# Patient Record
Sex: MALE | Employment: OTHER
[De-identification: names, ages, dates, MRNs, and addresses within clinical notes are randomized per-mention and may not be internally consistent; named-entity substitution may affect disease eponyms.]

---

## 2024-05-06 ENCOUNTER — OFFICE VISIT (OUTPATIENT)
Dept: INTERNAL MEDICINE | Facility: OTHER | Age: 64
End: 2024-05-06
Payer: MEDICARE

## 2024-05-06 VITALS
HEIGHT: 68 IN | HEART RATE: 93 BPM | BODY MASS INDEX: 23.31 KG/M2 | WEIGHT: 153.8 LBS | SYSTOLIC BLOOD PRESSURE: 107 MMHG | TEMPERATURE: 97.7 F | DIASTOLIC BLOOD PRESSURE: 68 MMHG | OXYGEN SATURATION: 96 %

## 2024-05-06 DIAGNOSIS — E11.9 CONTROLLED TYPE 2 DIABETES MELLITUS WITHOUT COMPLICATION, WITHOUT LONG-TERM CURRENT USE OF INSULIN (HCC): ICD-10-CM

## 2024-05-06 DIAGNOSIS — Z13.228 SCREENING FOR METABOLIC DISORDER: ICD-10-CM

## 2024-05-06 DIAGNOSIS — R35.1 NOCTURIA: ICD-10-CM

## 2024-05-06 DIAGNOSIS — R91.1 LUNG NODULE: ICD-10-CM

## 2024-05-06 DIAGNOSIS — Z11.4 SCREENING FOR HIV WITHOUT PRESENCE OF RISK FACTORS: ICD-10-CM

## 2024-05-06 DIAGNOSIS — Z23 NEED FOR VACCINATION: ICD-10-CM

## 2024-05-06 DIAGNOSIS — E78.5 HYPERLIPIDEMIA, UNSPECIFIED HYPERLIPIDEMIA TYPE: ICD-10-CM

## 2024-05-06 PROCEDURE — G0009 ADMIN PNEUMOCOCCAL VACCINE: HCPCS | Mod: GC

## 2024-05-06 PROCEDURE — 99204 OFFICE O/P NEW MOD 45 MIN: CPT | Mod: 25,GC

## 2024-05-06 PROCEDURE — 90677 PCV20 VACCINE IM: CPT | Mod: GC

## 2024-05-06 RX ORDER — TAMSULOSIN HYDROCHLORIDE 0.4 MG/1
CAPSULE ORAL
COMMUNITY
Start: 2024-04-12 | End: 2024-05-06 | Stop reason: SDUPTHER

## 2024-05-06 RX ORDER — TAMSULOSIN HYDROCHLORIDE 0.4 MG/1
0.4 CAPSULE ORAL DAILY
Qty: 90 CAPSULE | Refills: 1 | Status: SHIPPED | OUTPATIENT
Start: 2024-05-06

## 2024-05-06 RX ORDER — EMPAGLIFLOZIN, METFORMIN HYDROCHLORIDE 12.5; 1 MG/1; MG/1
TABLET, EXTENDED RELEASE ORAL
COMMUNITY
Start: 2024-04-12 | End: 2024-05-06 | Stop reason: SDUPTHER

## 2024-05-06 RX ORDER — EMPAGLIFLOZIN, METFORMIN HYDROCHLORIDE 12.5; 1 MG/1; MG/1
TABLET, EXTENDED RELEASE ORAL
Qty: 180 TABLET | Refills: 1 | Status: SHIPPED | OUTPATIENT
Start: 2024-05-06

## 2024-05-06 RX ORDER — ATORVASTATIN CALCIUM 20 MG/1
TABLET, FILM COATED ORAL
Qty: 90 TABLET | Refills: 1 | Status: SHIPPED | OUTPATIENT
Start: 2024-05-06

## 2024-05-06 RX ORDER — SILDENAFIL 25 MG/1
TABLET, FILM COATED ORAL
COMMUNITY
End: 2024-05-06

## 2024-05-06 RX ORDER — ATORVASTATIN CALCIUM 20 MG/1
TABLET, FILM COATED ORAL
COMMUNITY
End: 2024-05-06 | Stop reason: SDUPTHER

## 2024-05-06 RX ORDER — EMPAGLIFLOZIN, METFORMIN HYDROCHLORIDE 25; 1000 MG/1; MG/1
TABLET, EXTENDED RELEASE ORAL
COMMUNITY
End: 2024-05-06 | Stop reason: CLARIF

## 2024-05-06 NOTE — PATIENT INSTRUCTIONS
Consider getting SHINGLES VACCINE at any pharmacy     Please obtain bloodwork prior to your next appointment.

## 2024-05-07 ENCOUNTER — TELEPHONE (OUTPATIENT)
Dept: INTERNAL MEDICINE | Facility: OTHER | Age: 64
End: 2024-05-07
Payer: MEDICARE

## 2024-05-07 NOTE — TELEPHONE ENCOUNTER
Phone Number Called: [phone]    Call outcome:  Spoke to [facility]     Message: Received fax for pt's atorvastatin to change to 100 day supply to be dispensed per insurance. Dr. Corey said it was okay to call pharmacy and relayed info. They are aware and will proceed with dispensing med. Closing enc

## 2024-05-08 PROBLEM — E11.8 TYPE 2 DIABETES MELLITUS WITH UNSPECIFIED COMPLICATIONS (HCC): Status: ACTIVE | Noted: 2024-05-08

## 2024-05-08 PROBLEM — E78.2 MIXED HYPERLIPIDEMIA: Status: ACTIVE | Noted: 2024-05-08

## 2024-05-08 PROBLEM — K63.5 POLYP OF COLON: Status: ACTIVE | Noted: 2022-10-17

## 2024-05-08 PROBLEM — N52.8 OTHER MALE ERECTILE DYSFUNCTION: Status: ACTIVE | Noted: 2024-05-08

## 2024-05-08 PROBLEM — R91.8 LUNG NODULES: Status: ACTIVE | Noted: 2024-05-08

## 2024-05-08 PROBLEM — N40.1 BENIGN PROSTATIC HYPERPLASIA WITH URINARY OBSTRUCTION: Status: ACTIVE | Noted: 2024-05-08

## 2024-05-08 PROBLEM — N13.8 BENIGN PROSTATIC HYPERPLASIA WITH URINARY OBSTRUCTION: Status: ACTIVE | Noted: 2024-05-08

## 2024-05-08 ASSESSMENT — ENCOUNTER SYMPTOMS
NEUROLOGICAL NEGATIVE: 1
VOMITING: 0
FEVER: 0
MUSCULOSKELETAL NEGATIVE: 1
ABDOMINAL PAIN: 0
NAUSEA: 0
CARDIOVASCULAR NEGATIVE: 1
CHILLS: 0
FLANK PAIN: 0
EYES NEGATIVE: 1
RESPIRATORY NEGATIVE: 1

## 2024-05-20 ENCOUNTER — HOSPITAL ENCOUNTER (OUTPATIENT)
Dept: LAB | Facility: MEDICAL CENTER | Age: 64
End: 2024-05-20
Payer: MEDICARE

## 2024-05-20 DIAGNOSIS — E11.9 CONTROLLED TYPE 2 DIABETES MELLITUS WITHOUT COMPLICATION, WITHOUT LONG-TERM CURRENT USE OF INSULIN (HCC): ICD-10-CM

## 2024-05-20 DIAGNOSIS — E78.5 HYPERLIPIDEMIA, UNSPECIFIED HYPERLIPIDEMIA TYPE: ICD-10-CM

## 2024-05-20 DIAGNOSIS — Z13.228 SCREENING FOR METABOLIC DISORDER: ICD-10-CM

## 2024-05-20 DIAGNOSIS — Z11.4 SCREENING FOR HIV WITHOUT PRESENCE OF RISK FACTORS: ICD-10-CM

## 2024-05-20 LAB — HIV 1+2 AB+HIV1 P24 AG SERPL QL IA: NORMAL

## 2024-05-21 LAB
ALBUMIN SERPL BCP-MCNC: 4.5 G/DL (ref 3.2–4.9)
ALBUMIN/GLOB SERPL: 1.5 G/DL
ALP SERPL-CCNC: 66 U/L (ref 30–99)
ALT SERPL-CCNC: 16 U/L (ref 2–50)
ANION GAP SERPL CALC-SCNC: 11 MMOL/L (ref 7–16)
AST SERPL-CCNC: 19 U/L (ref 12–45)
BILIRUB SERPL-MCNC: 0.5 MG/DL (ref 0.1–1.5)
BUN SERPL-MCNC: 21 MG/DL (ref 8–22)
CALCIUM ALBUM COR SERPL-MCNC: 8.7 MG/DL (ref 8.5–10.5)
CALCIUM SERPL-MCNC: 9.1 MG/DL (ref 8.5–10.5)
CHLORIDE SERPL-SCNC: 107 MMOL/L (ref 96–112)
CHOLEST SERPL-MCNC: 172 MG/DL (ref 100–199)
CO2 SERPL-SCNC: 23 MMOL/L (ref 20–33)
CREAT SERPL-MCNC: 0.95 MG/DL (ref 0.5–1.4)
CREAT UR-MCNC: 115.2 MG/DL
GFR SERPLBLD CREATININE-BSD FMLA CKD-EPI: 89 ML/MIN/1.73 M 2
GLOBULIN SER CALC-MCNC: 3.1 G/DL (ref 1.9–3.5)
GLUCOSE SERPL-MCNC: 138 MG/DL (ref 65–99)
HDLC SERPL-MCNC: 58 MG/DL
LDLC SERPL CALC-MCNC: 101 MG/DL
MICROALBUMIN UR-MCNC: <1.2 MG/DL
MICROALBUMIN/CREAT UR: NORMAL MG/G (ref 0–30)
POTASSIUM SERPL-SCNC: 4.6 MMOL/L (ref 3.6–5.5)
PROT SERPL-MCNC: 7.6 G/DL (ref 6–8.2)
SODIUM SERPL-SCNC: 141 MMOL/L (ref 135–145)
TRIGL SERPL-MCNC: 63 MG/DL (ref 0–149)

## 2024-06-10 ENCOUNTER — TELEPHONE (OUTPATIENT)
Dept: INTERNAL MEDICINE | Facility: OTHER | Age: 64
End: 2024-06-10

## 2024-06-10 ENCOUNTER — OFFICE VISIT (OUTPATIENT)
Dept: INTERNAL MEDICINE | Facility: OTHER | Age: 64
End: 2024-06-10
Payer: MEDICARE

## 2024-06-10 VITALS
DIASTOLIC BLOOD PRESSURE: 77 MMHG | WEIGHT: 154.6 LBS | BODY MASS INDEX: 24.85 KG/M2 | OXYGEN SATURATION: 96 % | HEART RATE: 93 BPM | SYSTOLIC BLOOD PRESSURE: 123 MMHG | TEMPERATURE: 97.2 F | HEIGHT: 66 IN

## 2024-06-10 DIAGNOSIS — Z23 NEED FOR TDAP VACCINATION: ICD-10-CM

## 2024-06-10 DIAGNOSIS — E78.5 HYPERLIPIDEMIA, UNSPECIFIED HYPERLIPIDEMIA TYPE: ICD-10-CM

## 2024-06-10 DIAGNOSIS — Z13.6 SCREENING FOR AAA (ABDOMINAL AORTIC ANEURYSM): ICD-10-CM

## 2024-06-10 DIAGNOSIS — E11.9 CONTROLLED TYPE 2 DIABETES MELLITUS WITHOUT COMPLICATION, WITHOUT LONG-TERM CURRENT USE OF INSULIN (HCC): ICD-10-CM

## 2024-06-10 DIAGNOSIS — G47.33 OSA (OBSTRUCTIVE SLEEP APNEA): ICD-10-CM

## 2024-06-10 PROBLEM — K62.1 RECTAL POLYP: Status: ACTIVE | Noted: 2022-10-17

## 2024-06-10 LAB
HBA1C MFR BLD: 6.7 % (ref ?–5.8)
POCT INT CON NEG: NEGATIVE
POCT INT CON POS: POSITIVE

## 2024-06-10 PROCEDURE — 99214 OFFICE O/P EST MOD 30 MIN: CPT | Mod: 25,GC

## 2024-06-10 PROCEDURE — 92250 FUNDUS PHOTOGRAPHY W/I&R: CPT | Mod: 26,GC

## 2024-06-10 PROCEDURE — 90471 IMMUNIZATION ADMIN: CPT | Mod: GC

## 2024-06-10 PROCEDURE — 83036 HEMOGLOBIN GLYCOSYLATED A1C: CPT | Mod: QW,GC

## 2024-06-10 PROCEDURE — 90715 TDAP VACCINE 7 YRS/> IM: CPT | Mod: GC

## 2024-06-10 RX ORDER — ATORVASTATIN CALCIUM 40 MG/1
TABLET, FILM COATED ORAL
Qty: 1 TABLET | Refills: 1 | Status: SHIPPED
Start: 2024-06-10 | End: 2024-06-10

## 2024-06-10 RX ORDER — ATORVASTATIN CALCIUM 40 MG/1
40 TABLET, FILM COATED ORAL DAILY
Qty: 30 TABLET | Refills: 1 | Status: SHIPPED | OUTPATIENT
Start: 2024-06-10

## 2024-06-10 NOTE — TELEPHONE ENCOUNTER
Called back number [phone]  I spoke with Shanae and let her know it was a 30 day supply with one refill. Patient should be able to  today.

## 2024-06-10 NOTE — PROGRESS NOTES
Chief Complaint   Patient presents with    Establish Care     Pt needs to establish care with Dr. Hewitt       HPI: Valeria Mattson is a 64 y.o. male who presented to the clinic for the following.    # Follow-up for lab test  # Type 2 diabetes  # Hyperlipidemia  Patient has a long history of diabetes and hyperlipidemia, currently he is on atorvastatin 20 mg daily, and synjardy 12.5-1000mg daily.    -Patient has done monofilament test last time.    -Annual retina screening test performed today  Lab test since last visit:  -Fasting glucose 138  -Total cholesterol 172, , HDL 58, triglycerides 63  -Microalbumin creatinine ratio normal  -10-Years ASCVD risk 18.2  - Repeat HbA1c today  6.7  Reports recently feels increased weight, not on diet control, otherwise has no symptoms such as chest pain, shortness of breath, cough, heart racing.  Discussed with patient that he needs more extensive control of the diet to decrease the blood sugar level, patient agrees to see the nutrition specialist; also due to increased LDL we will increase the atorvastatin dose to 40 mg, target LDL less than 70.     # Reestablish  PMH: Was diagnosed with sleep apnea previously when he was in [place], no follow-up or CPAP.  No hypertension history.  Has BPH, is on tamsulosin, symptom well-controlled.   MH: No other medications except for items mentioned above  SH: Quit smoking 6 years ago, was on 1-2 packs/day for 40 years, drinking alcohol occasionally, never use recreational drugs    Patient Active Problem List    Diagnosis Date Noted    AMAYA (obstructive sleep apnea) 06/10/2024    Benign prostatic hyperplasia with urinary obstruction 05/08/2024    Other male erectile dysfunction 05/08/2024    Lung nodules 05/08/2024    Type 2 diabetes mellitus without complication, without long-term current use of insulin (HCC) 05/08/2024    Mixed hyperlipidemia 05/08/2024    Polyp of colon 10/17/2022    Rectal polyp 10/17/2022       Current Outpatient  "Medications   Medication Sig Dispense Refill    atorvastatin (LIPITOR) 40 MG Tab Take 1 Tablet by mouth every day. 30 Tablet 1    tamsulosin (FLOMAX) 0.4 MG capsule Take 1 Capsule by mouth every day. 90 Capsule 1    SYNJARDY XR 12.5-1000 MG TABLET SR 24 HR TAKE ONE TABLET BY MOUTH TWICE A DAY WITH BREAKFAST AND DINNER FOR DIABETES 30 180 Tablet 1     No current facility-administered medications for this visit.       ROS: As per HPI. Additional pertinent systems as noted below.  Constitutional:  Negative for fever, chills   HENT:  Negative for ear pain, sore throat, runny nose   Eyes:  Negative for blurred vision and double vision   Cardiovascular:  Negative for chest pain, palpitations   Respiratory:  Negative for cough, sputum production, shortness of breath   Gastrointestinal: Negative for abdominal pain, nausea, vomiting, diarrhea, or blood in stools   Genitourinary: Negative for dysuria, flank pain and hematuria  Skin: Negative for rash, itching  Extremities: Negative for leg swelling   Neurologic: Negative for headaches, dizziness, seizures, weakness   Endo/Heme/Allergies: Negative for polydipsia. Does not bruise/bleed easily  Psychiatric: Negative for suicidal or homicidal ideas     /77 (BP Location: Left arm, Patient Position: Sitting, BP Cuff Size: Small adult)   Pulse 93   Temp 36.2 °C (97.2 °F) (Temporal)   Ht 1.664 m (5' 5.5\")   Wt 70.1 kg (154 lb 9.6 oz)   SpO2 96%   BMI 25.34 kg/m²     Physical Exam   Constitutional:  Well developed, well nourished. Not in acute distress   HENT:  Normocephalic, Atraumatic, Oropharynx is pink and moist. No oral exudates, Nose normal   Eyes:  EOMI, Conjunctiva normal, No discharge. PERRLA   Neck:  Normal range of motion, No cervical lymphadenopathy. No thyromegaly.   Cardiovascular:  Regular rate and rhythm, No pedal edema, Intact distal pulses   Respiratory: Clear to auscultation bilaterally, No use of accessory muscles   Gastrointestinal: Bowel sounds " normal, Soft, No tenderness, No palpable masses/hepatosplenomegaly   Skin: Warm, Dry, No erythema, No rash, no induration.   Musculoskeletal: No cyanosis or clubbing, normal ROM   Neurologic: Alert & oriented x 4, No focal deficits noted, cranial nerves II through X are grossly intact.   Psychiatric: Judgment normal, Mood normal, Memory normal     Note: I have reviewed all pertinent labs and diagnostic tests associated with this visit with specific comments listed under the assessment and plan below    Assessment and Plan  1. Controlled type 2 diabetes mellitus without complication, without long-term current use of insulin ([facility])  HbA1c 6.7 today, patient needs strict control of his diet, as he feels increased weight recently, still eating high-fat food, patient agrees to see the nutrition specialist.   - POCT  A1C  - Referral to Nutrition Services  - POCT Retinal Eye Exam  - Follow up in 3 months     2. Hyperlipidemia, unspecified hyperlipidemia type  Most recent , 10 years ASCVD risk 18.2,  in the setting of long-term diabetes history his target LDL should be less than 70, will increase his atorvastatin to high-dose 40 mg daily  - atorvastatin (LIPITOR) 40 MG Tab; Take 1 tablet daily  Dispense: 1 Tablet; Refill: 1    3. AMAYA (obstructive sleep apnea)  Was diagnosed with AMAYA many years ago when he was in [place], has very bad snoring and stop breathing during the night.  Wants to further evaluation and to consider CPAP if needed.   - Polysomnography, 4 or More; Future  - Referral to Pulmonary and Sleep Medicine    4. Need for Tdap vaccination  - Tdap =>6yo IM    5. Screening for AAA (abdominal aortic aneurysm)  - US-ABDOMINAL AORTA SCREENING (AAA); Future      Followup: Return in about 3 months (around 9/10/2024) for annual wellness checkup .  Signed by: Alberto Hewitt M.D.  [facility], PGY-1

## 2024-06-10 NOTE — TELEPHONE ENCOUNTER
VOICEMAIL  1. Caller Name: [facility] pharmacist                      Call Back Number: [phone]    2. Message: Asked for the quantity for his medication Atorvastatin     3. Patient approves office to leave a detailed voicemail/MyChart message: N\A

## 2024-06-12 LAB — RETINAL SCREEN: NEGATIVE

## 2024-07-01 ENCOUNTER — SLEEP STUDY (OUTPATIENT)
Dept: SLEEP MEDICINE | Facility: MEDICAL CENTER | Age: 64
End: 2024-07-01
Payer: MEDICARE

## 2024-07-01 DIAGNOSIS — G47.33 OSA (OBSTRUCTIVE SLEEP APNEA): ICD-10-CM

## 2024-07-01 PROCEDURE — 95811 POLYSOM 6/>YRS CPAP 4/> PARM: CPT | Performed by: STUDENT IN AN ORGANIZED HEALTH CARE EDUCATION/TRAINING PROGRAM

## 2024-08-12 DIAGNOSIS — E78.5 HYPERLIPIDEMIA, UNSPECIFIED HYPERLIPIDEMIA TYPE: ICD-10-CM

## 2024-08-12 NOTE — TELEPHONE ENCOUNTER
Received request via: Patient    Was the patient seen in the last year in this department? Yes    Does the patient have an active prescription (recently filled or refills available) for medication(s) requested? No    [facility]    Does the patient have longterm Plus and need 100-day supply? (This applies to ALL medications) Patient does not have SCP

## 2024-08-12 NOTE — TELEPHONE ENCOUNTER
Patient is asking for a refill request for the Atorvastatin to be sent to [facility] pharmacy.

## 2024-08-13 RX ORDER — ATORVASTATIN CALCIUM 40 MG/1
40 TABLET, FILM COATED ORAL DAILY
Qty: 30 TABLET | Refills: 1 | Status: SHIPPED | OUTPATIENT
Start: 2024-08-13

## 2024-09-04 ENCOUNTER — PATIENT MESSAGE (OUTPATIENT)
Dept: HEALTH INFORMATION MANAGEMENT | Facility: OTHER | Age: 64
End: 2024-09-04

## 2024-09-12 PROBLEM — E11.9 CONTROLLED TYPE 2 DIABETES MELLITUS WITHOUT COMPLICATION, WITHOUT LONG-TERM CURRENT USE OF INSULIN (HCC): Status: ACTIVE | Noted: 2024-09-12

## 2024-10-07 ENCOUNTER — APPOINTMENT (OUTPATIENT)
Dept: INTERNAL MEDICINE | Facility: OTHER | Age: 64
End: 2024-10-07
Payer: MEDICARE

## 2024-10-07 VITALS
WEIGHT: 154.2 LBS | TEMPERATURE: 97.7 F | HEIGHT: 66 IN | OXYGEN SATURATION: 95 % | HEART RATE: 96 BPM | SYSTOLIC BLOOD PRESSURE: 116 MMHG | DIASTOLIC BLOOD PRESSURE: 65 MMHG | BODY MASS INDEX: 24.78 KG/M2

## 2024-10-07 DIAGNOSIS — R35.1 NOCTURIA: ICD-10-CM

## 2024-10-07 DIAGNOSIS — E78.5 HYPERLIPIDEMIA, UNSPECIFIED HYPERLIPIDEMIA TYPE: ICD-10-CM

## 2024-10-07 DIAGNOSIS — E11.9 CONTROLLED TYPE 2 DIABETES MELLITUS WITHOUT COMPLICATION, WITHOUT LONG-TERM CURRENT USE OF INSULIN (HCC): ICD-10-CM

## 2024-10-07 DIAGNOSIS — Z12.2 ENCOUNTER FOR SCREENING FOR LUNG CANCER: ICD-10-CM

## 2024-10-07 DIAGNOSIS — F17.200 TOBACCO USE DISORDER: ICD-10-CM

## 2024-10-07 DIAGNOSIS — Z23 NEEDS FLU SHOT: ICD-10-CM

## 2024-10-07 DIAGNOSIS — Z87.891 PERSONAL HISTORY OF NICOTINE DEPENDENCE: ICD-10-CM

## 2024-10-07 PROCEDURE — 90662 IIV NO PRSV INCREASED AG IM: CPT | Mod: GE

## 2024-10-07 PROCEDURE — 99213 OFFICE O/P EST LOW 20 MIN: CPT | Mod: 25,GE

## 2024-10-07 PROCEDURE — G0008 ADMIN INFLUENZA VIRUS VAC: HCPCS | Mod: GE

## 2024-10-07 RX ORDER — EMPAGLIFLOZIN, METFORMIN HYDROCHLORIDE 12.5; 1 MG/1; MG/1
TABLET, EXTENDED RELEASE ORAL
Qty: 180 TABLET | Refills: 1 | Status: SHIPPED | OUTPATIENT
Start: 2024-10-07

## 2024-10-07 RX ORDER — TAMSULOSIN HYDROCHLORIDE 0.4 MG/1
0.4 CAPSULE ORAL DAILY
Qty: 90 CAPSULE | Refills: 1 | Status: SHIPPED | OUTPATIENT
Start: 2024-10-07

## 2024-10-07 RX ORDER — ATORVASTATIN CALCIUM 40 MG/1
40 TABLET, FILM COATED ORAL DAILY
Qty: 30 TABLET | Refills: 1 | Status: SHIPPED | OUTPATIENT
Start: 2024-10-07

## 2024-10-15 ENCOUNTER — TELEPHONE (OUTPATIENT)
Dept: HEALTH INFORMATION MANAGEMENT | Facility: OTHER | Age: 64
End: 2024-10-15
Payer: MEDICARE

## 2024-10-21 ENCOUNTER — HOSPITAL ENCOUNTER (OUTPATIENT)
Dept: LAB | Facility: MEDICAL CENTER | Age: 64
End: 2024-10-21
Payer: MEDICARE

## 2024-10-21 DIAGNOSIS — E78.5 HYPERLIPIDEMIA, UNSPECIFIED HYPERLIPIDEMIA TYPE: ICD-10-CM

## 2024-10-21 DIAGNOSIS — E11.9 CONTROLLED TYPE 2 DIABETES MELLITUS WITHOUT COMPLICATION, WITHOUT LONG-TERM CURRENT USE OF INSULIN (HCC): ICD-10-CM

## 2024-10-21 LAB
EST. AVERAGE GLUCOSE BLD GHB EST-MCNC: 151 MG/DL
HBA1C MFR BLD: 6.9 % (ref 4–5.6)

## 2024-10-21 PROCEDURE — 80061 LIPID PANEL: CPT

## 2024-10-21 PROCEDURE — 83036 HEMOGLOBIN GLYCOSYLATED A1C: CPT

## 2024-10-21 PROCEDURE — 36415 COLL VENOUS BLD VENIPUNCTURE: CPT

## 2024-10-22 LAB
CHOLEST SERPL-MCNC: 165 MG/DL (ref 100–199)
FASTING STATUS PATIENT QL REPORTED: NORMAL
HDLC SERPL-MCNC: 66 MG/DL
LDLC SERPL CALC-MCNC: 80 MG/DL
TRIGL SERPL-MCNC: 96 MG/DL (ref 0–149)

## 2024-10-23 PROBLEM — E11.69 HYPERLIPIDEMIA ASSOCIATED WITH TYPE 2 DIABETES MELLITUS (HCC): Status: ACTIVE | Noted: 2024-05-08

## 2024-10-23 PROBLEM — E11.65 CONTROLLED TYPE 2 DIABETES MELLITUS WITH HYPERGLYCEMIA, WITHOUT LONG-TERM CURRENT USE OF INSULIN (HCC): Status: ACTIVE | Noted: 2024-09-12

## 2024-10-23 PROBLEM — E78.5 HYPERLIPIDEMIA ASSOCIATED WITH TYPE 2 DIABETES MELLITUS (HCC): Status: ACTIVE | Noted: 2024-05-08

## 2024-10-28 ENCOUNTER — OFFICE VISIT (OUTPATIENT)
Dept: SLEEP MEDICINE | Facility: MEDICAL CENTER | Age: 64
End: 2024-10-28
Attending: FAMILY MEDICINE
Payer: MEDICARE

## 2024-10-28 ENCOUNTER — OFFICE VISIT (OUTPATIENT)
Dept: MEDICAL GROUP | Facility: MEDICAL CENTER | Age: 64
End: 2024-10-28
Payer: MEDICARE

## 2024-10-28 VITALS
WEIGHT: 154 LBS | SYSTOLIC BLOOD PRESSURE: 100 MMHG | DIASTOLIC BLOOD PRESSURE: 60 MMHG | RESPIRATION RATE: 16 BRPM | OXYGEN SATURATION: 97 % | BODY MASS INDEX: 23.34 KG/M2 | HEIGHT: 68 IN | HEART RATE: 83 BPM

## 2024-10-28 VITALS
BODY MASS INDEX: 23.34 KG/M2 | WEIGHT: 154 LBS | HEIGHT: 68 IN | SYSTOLIC BLOOD PRESSURE: 110 MMHG | DIASTOLIC BLOOD PRESSURE: 66 MMHG

## 2024-10-28 DIAGNOSIS — N40.1 BENIGN PROSTATIC HYPERPLASIA WITH URINARY OBSTRUCTION: ICD-10-CM

## 2024-10-28 DIAGNOSIS — E11.69 HYPERLIPIDEMIA ASSOCIATED WITH TYPE 2 DIABETES MELLITUS (HCC): ICD-10-CM

## 2024-10-28 DIAGNOSIS — E78.5 HYPERLIPIDEMIA ASSOCIATED WITH TYPE 2 DIABETES MELLITUS (HCC): ICD-10-CM

## 2024-10-28 DIAGNOSIS — E11.65 CONTROLLED TYPE 2 DIABETES MELLITUS WITH HYPERGLYCEMIA, WITHOUT LONG-TERM CURRENT USE OF INSULIN (HCC): ICD-10-CM

## 2024-10-28 DIAGNOSIS — N13.8 BENIGN PROSTATIC HYPERPLASIA WITH URINARY OBSTRUCTION: ICD-10-CM

## 2024-10-28 DIAGNOSIS — Z87.891 PERSONAL HISTORY OF TOBACCO USE, PRESENTING HAZARDS TO HEALTH: ICD-10-CM

## 2024-10-28 PROCEDURE — 1126F AMNT PAIN NOTED NONE PRSNT: CPT | Performed by: FAMILY MEDICINE

## 2024-10-28 PROCEDURE — 3078F DIAST BP <80 MM HG: CPT | Performed by: FAMILY MEDICINE

## 2024-10-28 PROCEDURE — 3078F DIAST BP <80 MM HG: CPT

## 2024-10-28 PROCEDURE — G0296 VISIT TO DETERM LDCT ELIG: HCPCS | Performed by: FAMILY MEDICINE

## 2024-10-28 PROCEDURE — 3074F SYST BP LT 130 MM HG: CPT | Performed by: FAMILY MEDICINE

## 2024-10-28 PROCEDURE — 3074F SYST BP LT 130 MM HG: CPT

## 2024-10-28 PROCEDURE — 1126F AMNT PAIN NOTED NONE PRSNT: CPT

## 2024-10-28 PROCEDURE — G0439 PPPS, SUBSEQ VISIT: HCPCS

## 2024-10-28 SDOH — ECONOMIC STABILITY: TRANSPORTATION INSECURITY
IN THE PAST 12 MONTHS, HAS THE LACK OF TRANSPORTATION KEPT YOU FROM MEDICAL APPOINTMENTS OR FROM GETTING MEDICATIONS?: NO

## 2024-10-28 SDOH — ECONOMIC STABILITY: FOOD INSECURITY: WITHIN THE PAST 12 MONTHS, YOU WORRIED THAT YOUR FOOD WOULD RUN OUT BEFORE YOU GOT MONEY TO BUY MORE.: NEVER TRUE

## 2024-10-28 SDOH — ECONOMIC STABILITY: TRANSPORTATION INSECURITY
IN THE PAST 12 MONTHS, HAS LACK OF TRANSPORTATION KEPT YOU FROM MEETINGS, WORK, OR FROM GETTING THINGS NEEDED FOR DAILY LIVING?: NO

## 2024-10-28 SDOH — ECONOMIC STABILITY: FOOD INSECURITY: WITHIN THE PAST 12 MONTHS, THE FOOD YOU BOUGHT JUST DIDN'T LAST AND YOU DIDN'T HAVE MONEY TO GET MORE.: NEVER TRUE

## 2024-10-28 SDOH — ECONOMIC STABILITY: INCOME INSECURITY: HOW HARD IS IT FOR YOU TO PAY FOR THE VERY BASICS LIKE FOOD, HOUSING, MEDICAL CARE, AND HEATING?: NOT VERY HARD

## 2024-10-28 ASSESSMENT — ENCOUNTER SYMPTOMS
WHEEZING: 0
CHILLS: 0
PALPITATIONS: 0
COUGH: 0
FEVER: 0
WEIGHT LOSS: 0
SPUTUM PRODUCTION: 0
GENERAL WELL-BEING: FAIR
HEMOPTYSIS: 0
SHORTNESS OF BREATH: 0

## 2024-10-28 ASSESSMENT — ACTIVITIES OF DAILY LIVING (ADL): BATHING_REQUIRES_ASSISTANCE: 0

## 2024-10-28 ASSESSMENT — PAIN SCALES - GENERAL: PAINLEVEL: NO PAIN

## 2024-10-28 ASSESSMENT — PATIENT HEALTH QUESTIONNAIRE - PHQ9: CLINICAL INTERPRETATION OF PHQ2 SCORE: 0

## 2024-10-31 ENCOUNTER — TELEPHONE (OUTPATIENT)
Dept: HEALTH INFORMATION MANAGEMENT | Facility: OTHER | Age: 64
End: 2024-10-31
Payer: MEDICARE

## 2024-11-11 ENCOUNTER — HOSPITAL ENCOUNTER (OUTPATIENT)
Dept: RADIOLOGY | Facility: MEDICAL CENTER | Age: 64
End: 2024-11-11
Payer: MEDICARE

## 2024-11-11 DIAGNOSIS — Z13.6 SCREENING FOR AAA (ABDOMINAL AORTIC ANEURYSM): ICD-10-CM

## 2024-11-11 PROCEDURE — 76706 US ABDL AORTA SCREEN AAA: CPT

## 2024-11-25 ENCOUNTER — OFFICE VISIT (OUTPATIENT)
Dept: SLEEP MEDICINE | Facility: MEDICAL CENTER | Age: 64
End: 2024-11-25
Payer: MEDICARE

## 2024-11-25 ENCOUNTER — HOSPITAL ENCOUNTER (OUTPATIENT)
Dept: RADIOLOGY | Facility: MEDICAL CENTER | Age: 64
End: 2024-11-25
Attending: FAMILY MEDICINE
Payer: MEDICARE

## 2024-11-25 VITALS
DIASTOLIC BLOOD PRESSURE: 76 MMHG | HEART RATE: 94 BPM | RESPIRATION RATE: 16 BRPM | SYSTOLIC BLOOD PRESSURE: 112 MMHG | BODY MASS INDEX: 23.34 KG/M2 | HEIGHT: 68 IN | WEIGHT: 154 LBS | OXYGEN SATURATION: 95 %

## 2024-11-25 DIAGNOSIS — Z87.891 PERSONAL HISTORY OF TOBACCO USE, PRESENTING HAZARDS TO HEALTH: ICD-10-CM

## 2024-11-25 DIAGNOSIS — G47.34 NOCTURNAL HYPOXIA: ICD-10-CM

## 2024-11-25 DIAGNOSIS — G47.33 OSA (OBSTRUCTIVE SLEEP APNEA): Primary | ICD-10-CM

## 2024-11-25 PROCEDURE — 3074F SYST BP LT 130 MM HG: CPT | Performed by: STUDENT IN AN ORGANIZED HEALTH CARE EDUCATION/TRAINING PROGRAM

## 2024-11-25 PROCEDURE — 99204 OFFICE O/P NEW MOD 45 MIN: CPT | Performed by: STUDENT IN AN ORGANIZED HEALTH CARE EDUCATION/TRAINING PROGRAM

## 2024-11-25 PROCEDURE — 71271 CT THORAX LUNG CANCER SCR C-: CPT

## 2024-11-25 PROCEDURE — 3078F DIAST BP <80 MM HG: CPT | Performed by: STUDENT IN AN ORGANIZED HEALTH CARE EDUCATION/TRAINING PROGRAM

## 2024-11-25 PROCEDURE — 99211 OFF/OP EST MAY X REQ PHY/QHP: CPT | Performed by: STUDENT IN AN ORGANIZED HEALTH CARE EDUCATION/TRAINING PROGRAM

## 2024-11-25 NOTE — PROGRESS NOTES
"     [facility] Sleep Center Consult Note     Date: 11/25/2024 / Time: 12:29 PM      Thank you for requesting a sleep medicine consultation on Valeria Mattson at the sleep center. Presents today with the chief complaints of snoring and sleep apnea. He is referred by Alberto Hewitt M.D.  [address] [place] [address] for evaluation and treatment of obstructive sleep apnea.    Bailey  present today language line    HISTORY OF PRESENT ILLNESS:     Valeria Mattson is a 64 y.o. male with hyperlipidemia, history of controlled type 2 diabetes mellitus, BPH, and severe obstructive sleep apnea.  Presents today to discuss management of sleep apnea.    He reports he underwent a sleep study earlier this year.  Would lead to getting the sleep study was affected he does snore in his sleep and he is concerned about stopping breathing at night.  He feels at times he can feel as though he is pausing in breathing.    As per supplemental questionnaire to be scanned or imported into chart:    [place] Sleepiness Score: 15    Sleep Schedule  Bedtime: Weekday & Weekend 12-1am  Wake time: Weekday & Weekend 8am  Sleep-onset latency: mins   Awakenings from sleep: 1-2  Difficulty falling back asleep: No  Bedroom partner: No  Naps: sometimes.     DAYTIME SYMPTOMS:   Excessive daytime sleepiness: Yes  Daytime fatigue: in afternoon   Difficulty concentrating: No   Memory problems: No   Irritability:No   Work/school performance issues: No  Sleepiness with driving: No   Caffeine/stimulant use: Yes  Alcohol use:No     SLEEP RELATED SYMPTOMS  Snoring: Yes  Witnessed apnea or gasping/choking: Yes  Dry mouth or mouth breathing: No   Sweating: No   Teeth grinding/biting: No   Morning headaches: No   Refreshed Upon Awakening: Yes     SLEEP RELATED BEHAVIORS:  Parasomnias (walking, talking, eating, violence): No   Leg kicking: No   Restless legs - \"urge to move\": No   Nightmares: No  Recurrent: No   Dream enactment: No    "   NARCOLEPSY:  Cataplexy: No   Sleep paralysis: No   Sleep attacks: No   Hypnagogic/hypnopompic hallucinations: No     MEDICAL HISTORY  Past Medical History:   Diagnosis Date    Apnea, sleep     Daytime sleepiness     Diabetes (HCC)     Hyperlipidemia     Snoring         SURGICAL HISTORY  No past surgical history on file.     FAMILY HISTORY  No family history on file.    SOCIAL HISTORY  Social History     Socioeconomic History    Marital status:    Tobacco Use    Smoking status: Former     Current packs/day: 0.00     Average packs/day: 1.5 packs/day for 43.0 years (64.5 ttl pk-yrs)     Types: Cigarettes     Start date:      Quit date: 2018     Years since quittin.9    Smokeless tobacco: Never   Vaping Use    Vaping status: Never Used   Substance and Sexual Activity    Alcohol use: Never    Drug use: Never    Sexual activity: Yes     Partners: Female     Social Drivers of Health     Financial Resource Strain: Low Risk  (10/28/2024)    Overall Financial Resource Strain (CARDIA)     Difficulty of Paying Living Expenses: Not very hard   Food Insecurity: No Food Insecurity (10/28/2024)    Hunger Vital Sign     Worried About Running Out of Food in the Last Year: Never true     Ran Out of Food in the Last Year: Never true   Transportation Needs: No Transportation Needs (10/28/2024)    PRAPARE - Transportation     Lack of Transportation (Medical): No     Lack of Transportation (Non-Medical): No   Physical Activity: Inactive (3/19/2024)    Exercise Vital Sign     Days of Exercise per Week: 0 days     Minutes of Exercise per Session: 0 min   Stress: No Stress Concern Present (3/19/2024)    [employer] [place] [employer] - Occupational Stress Questionnaire     Feeling of Stress : Not at all   Housing Stability: Low Risk  (3/26/2024)    Housing Stability Vital Sign     Unable to Pay for Housing in the Last Year: No     Number of Places Lived in the Last Year: 1     Unstable Housing in the Last Year: No  "       Occupation: Retired    CURRENT MEDICATIONS  Current Outpatient Medications   Medication Sig Dispense Refill    atorvastatin (LIPITOR) 40 MG Tab Take 1 Tablet by mouth every day. 30 Tablet 1    SYNJARDY XR 12.5-1000 MG TABLET SR 24 HR TAKE ONE TABLET BY MOUTH TWICE A DAY WITH BREAKFAST AND DINNER FOR DIABETES 30 180 Tablet 1    tamsulosin (FLOMAX) 0.4 MG capsule Take 1 Capsule by mouth every day. 90 Capsule 1     No current facility-administered medications for this visit.       REVIEW OF SYSTEMS  Constitutional: Denies fevers, Denies weight changes  Ears/Nose/Throat/Mouth: Denies nasal congestion or sore throat   Cardiovascular: Denies chest pain  Respiratory: Denies shortness of breath, Denies cough  Gastrointestinal/Hepatic: Denies nausea, vomiting  Sleep: see HPI    Physical Examination:  Vitals/ General Appearance:   Weight/BMI: Body mass index is 23.42 kg/m².  /76 (BP Location: Left arm, Patient Position: Sitting, BP Cuff Size: Adult)   Pulse 94   Resp 16   Ht 1.727 m (5' 8\")   Wt 69.9 kg (154 lb)   SpO2 95%   Vitals:    11/25/24 1240   BP: 112/76   BP Location: Left arm   Patient Position: Sitting   BP Cuff Size: Adult   Pulse: 94   Resp: 16   SpO2: 95%   Weight: 69.9 kg (154 lb)   Height: 1.727 m (5' 8\")       Pt. is alert and oriented to time, place and person. Cooperative and in no apparent distress.     Constitutional: Alert, no distress, well-groomed.  Skin: No rashes in visible areas.  Eye: Round. Conjunctiva clear, lids normal. No icterus.   ENT EXAM  Nasal alae/valves collapsible: No   Nasal septum deviation: No   Nasal turbinate hypertrophy: No  Hard palate narrow: No  Hard palate high: No  Soft palate/uvula (Mallampati score): 4  Tongue Scalloping: No   Retrognathia: No   Micrognathia: No   Cardiovascular:no murmus/gallops/rubs, normal S1 and S2 heart sounds, regular rate and rhythm  Pulmonary:Clear to auscultation, No wheezes, No crackles.  Neurologic:Awake, alert and oriented x 3, " Normal age appropriate gait, No involuntary motions.  Extremities: No clubbing, cyanosis, or edema       ASSESSMENT AND PLAN   Valeria Mattson is a 64 y.o. male with hyperlipidemia, history of controlled type 2 diabetes mellitus, BPH, and severe obstructive sleep apnea.  Presents today to discuss management of sleep apnea.    Obstructive sleep apnea   Valeria Mattson  has Obstructive Sleep Apnea (AMAYA). Valeria Mattson has symptoms of snoring, daytime sleepiness. These can interfere with activities of daily living.   ESS 15  Pt has risk factors for AMAYA include age and crowded oropharynx.     The pathophysiology of AMAYA and the increased risk of cardiovascular morbidity from untreated AMAYA is discussed in detail with the patient. He  also has DM type 2, BPH, which can be worsened by AMAYA.      Obstructive sleep apnea   Reviewed PSG with patient showing an AHI of 84,  and Min Oxygen saturation of 70%.  Time at or below 88% saturation 61minutes  Based on sleep study and symptoms meets criteria for severe obstructive sleep apnea.   We discussed possible consequences of untreated AMAYA, including excessive daytime sleepiness and fatigue, cognitive dysfunction, cardiovascular complications such as elevated blood pressure, heart attacks, cardiac arrhythmias, and strokes. We discussed how AMAYA typically gets worse with age. We discussed treatment options for AMAYA, including the gold standard therapy (PAP), alternative options such as a mandibular advancement device (custom-made oral appliances) and surgeries. We will proceed CPAP therapy.     RECOMMENDATIONS  -Start Auto BiPAP at pressures EPAP 6 IPAP 17 pressure support 4 cm H2O  -Discussed importance of adherence/compliance   -Prescription generated for supplies   -Patient counseled to avoid driving when sleepy. Encouraged to anticipate sleepiness, consider taking a 10 min nap prior to driving, alternate with another , or pull over if sleepy while driving  -Advised to contact our  office or myself with any questions via [facility]  -Follow up in 3 months or sooner if needed      Please note portions of this record was created using voice recognition software. I have made every reasonable attempt to correct obvious errors, but I expect that there are errors of grammar and possibly content I did not discover before finalizing the note.

## 2024-11-27 ENCOUNTER — TELEPHONE (OUTPATIENT)
Dept: SLEEP MEDICINE | Facility: MEDICAL CENTER | Age: 64
End: 2024-11-27

## 2024-11-27 ENCOUNTER — OFFICE VISIT (OUTPATIENT)
Dept: URGENT CARE | Facility: CLINIC | Age: 64
End: 2024-11-27
Payer: MEDICARE

## 2024-11-27 ENCOUNTER — TELEPHONE (OUTPATIENT)
Dept: INTERNAL MEDICINE | Facility: OTHER | Age: 64
End: 2024-11-27
Payer: MEDICARE

## 2024-11-27 VITALS
HEIGHT: 68 IN | SYSTOLIC BLOOD PRESSURE: 114 MMHG | DIASTOLIC BLOOD PRESSURE: 70 MMHG | TEMPERATURE: 97.4 F | RESPIRATION RATE: 18 BRPM | OXYGEN SATURATION: 95 % | HEART RATE: 100 BPM | WEIGHT: 148 LBS | BODY MASS INDEX: 22.43 KG/M2

## 2024-11-27 DIAGNOSIS — R91.1 LUNG NODULE SEEN ON IMAGING STUDY: ICD-10-CM

## 2024-11-27 DIAGNOSIS — R91.8 ABNORMAL CT LUNG SCREENING: ICD-10-CM

## 2024-11-27 PROCEDURE — 3074F SYST BP LT 130 MM HG: CPT | Performed by: NURSE PRACTITIONER

## 2024-11-27 PROCEDURE — 3078F DIAST BP <80 MM HG: CPT | Performed by: NURSE PRACTITIONER

## 2024-11-27 PROCEDURE — 99213 OFFICE O/P EST LOW 20 MIN: CPT | Performed by: NURSE PRACTITIONER

## 2024-11-27 NOTE — TELEPHONE ENCOUNTER
Received patient's CT results and the note from Dr. Sierra, tried to call patient twice without answer, leave the voice message using Mandarin to remind patient to go to urgent care or emergency room for immediate evaluation.

## 2024-11-27 NOTE — TELEPHONE ENCOUNTER
Phoned patient with results of LDCT exam performed 11/25/24.    Mandarin :[phone]    He did not answer.  A voicemail was left stating in Mandarin stating that I was calling regarding his recent Ct results and given an abnormality, I recommend he go to urgent care now for further evaluation.     Referring provider Dr. Alberto Hewitt notified of results and incidental findings per this communication.  Health maintenance to be updated and patient will be sent lung cancer screening result letter.        CT-LUNG CANCER-SCREENING    Result Date: 11/27/2024 11/25/2024 3:30 PM HISTORY/REASON FOR EXAM:  Lung Cancer Screening. Smoking history. COMPARISON: None. TECHNIQUE/EXAM DESCRIPTION AND NUMBER OF VIEWS: Lung cancer screening without contrast. Low dose noncontrast helical images were obtained of the chest from the lung apices through the costophrenic sulci utilizing thin collimation and intervals with reconstructed images sent to PACS in axial, coronal and sagittal planes. Low dose optimization technique was utilized for this CT exam including automated exposure control and adjustment of the mA and/or kV according to patient size. I discussed the findings with Dr. Parul Sierra at transcription time. FINDINGS: Lungs: 8 cm right upper lobe infiltrate posteriorly with some small cavities. Considerations include contusion in the setting of trauma and pneumonia. Short-term follow-up is recommended. Small benign calcified left lung apex granuloma. Adjacent to that there is a 5 mm noncalcified nodule on image 37 and just below that a 6 mm or spiculated noncalcified nodule image 44. 7 mm superficial nodule in the right midlung. Mild scattered densities elsewhere. Mediastinum/Kathrin: No significant adenopathy. Pleura: No pleural effusion. Cardiac: Heart normal in size without pericardial effusion. Moderate coronary artery vascular calcification evident by this routine technique. Soft tissues: No chest wall lesion evident.  Bones: No acute or destructive process evident. Upper abdomen: The visible portions of the upper abdomen shows no acute abnormality.     1. Large 8 cm right upper lobe infiltrate with small cavities. Bronchopneumonia most likely. Clinical correlation and short-term follow-up needed. 2. 3 noncalcified nodules for which follow-up is recommended. Lung RADS: 3 - Probably Benign: Probably benign finding(s) - short term followup suggested; includes nodules with a low likelihood of becoming a clinically active cancer Management: 3-6 month LDCT along with clinical correlation.

## 2024-11-27 NOTE — PROGRESS NOTES
"Subjective:   Valeria Mattson  is a 64 y.o. male who presents for Results (Pcp referred to urgent care for the nodule ) and Dog Bite (Last night)     # [id number]   HPI  Patient is here today because he was called by pulmonary physician and instructed to proceed to urgent care.  He had a screening CT scan of his lungs for his history of smoking.  It was positive for a nodule.  He denies shortness of breath, chest pain, productive sputum, or wheezing.  He is established with the pulmonary sleep lab and has a follow-up appointment next month.  He has not seen his primary care who initially ordered the CT scan.  He is not sure why he was sent.  ROS      CURRENT MEDICATIONS:  atorvastatin Tabs  Synjardy XR Tb24  tamsulosin  Allergies:   No Known Allergies  Current Problems: Valeria Mattson does not have any pertinent problems on file.  Past Surgical Hx:  No past surgical history on file.   Past Social Hx:  reports that he quit smoking about 6 years ago. His smoking use included cigarettes. He started smoking about 49 years ago. He has a 64.5 pack-year smoking history. He has never used smokeless tobacco. He reports that he does not drink alcohol and does not use drugs.    Objective:   /70   Pulse 100   Temp 36.3 °C (97.4 °F) (Temporal)   Resp 18   Ht 1.727 m (5' 8\")   Wt 67.1 kg (148 lb)   SpO2 95%   BMI 22.50 kg/m²   Physical Exam  Vitals and nursing note reviewed.   Constitutional:       Appearance: Normal appearance. He is not ill-appearing.   HENT:      Right Ear: External ear normal.      Left Ear: External ear normal.      Mouth/Throat:      Mouth: Mucous membranes are moist.   Eyes:      Extraocular Movements: Extraocular movements intact.      Conjunctiva/sclera: Conjunctivae normal.      Pupils: Pupils are equal, round, and reactive to light.   Cardiovascular:      Rate and Rhythm: Normal rate.   Pulmonary:      Effort: Pulmonary effort is normal.   Musculoskeletal:         General: Normal range " of motion.      Cervical back: Normal range of motion and neck supple.   Skin:     General: Skin is warm and dry.   Neurological:      General: No focal deficit present.      Mental Status: He is alert.       Assessment/Plan:   1. Abnormal CT lung screening  - Referral to Pulmonary and Sleep Medicine    2. Lung nodule seen on imaging study  - Referral to Pulmonary and Sleep Medicine  Using the audiovisual  we were able to come up with a plan of action together.  I spent an extensive amount of time researching his records, use of the , and trying to figure out why patient was sent to the urgent care by pulmonary.  I think he needs a referral associated with his new diagnosis.  He is completely asymptomatic.  I have provided him with information regarding the CT scan, as well as a copy of his referral.  I have instructed him to follow-up with his primary care soon as possible.  Should he have any changes in his condition including shortness of breath, chest pain, wheezing he has been instructed to proceed to the emergency department for evaluation.  For my MDM, I have personally reviewed previous notes, and test results as pertinent to today's visit. Red flags, RTC and ER precautions discussed, with patient confirming their understanding of  need for immediate follow-up.  Discussed medication management options, risks and benefits, and alternatives to treatment plan agreed upon. Instructed to continue  medications without changes as ordered by primary care unless aforementioned above.  Patient expresses understanding and agrees to plan of care. All questions or concerns answered.     Please note that this dictation was created using voice recognition software. I have made every reasonable attempt to correct obvious errors,  but there may be grammar errors, and possibly content that I did not discover before finalizing the note.   This note was electronically signed by KIRBY Sanders

## 2024-12-02 ENCOUNTER — TELEPHONE (OUTPATIENT)
Dept: SLEEP MEDICINE | Facility: MEDICAL CENTER | Age: 64
End: 2024-12-02
Payer: MEDICARE

## 2024-12-02 ENCOUNTER — TELEPHONE (OUTPATIENT)
Dept: INTERNAL MEDICINE | Facility: OTHER | Age: 64
End: 2024-12-02
Payer: MEDICARE

## 2024-12-02 DIAGNOSIS — R91.8 LUNG NODULES: ICD-10-CM

## 2024-12-02 NOTE — TELEPHONE ENCOUNTER
Called patient this afternoon, discussed the next plan about the pulmonology appointment and TB test, patient understands, and he has scheduled the appointment with pulmonologist, and I have ordered the AFB culture x 3 times and instruct him to do it every day for 3 days, patient agrees.

## 2024-12-02 NOTE — TELEPHONE ENCOUNTER
I reached out to patient's PCP via [employer] to discuss TB work-up for Valeria and public health department notification.  Dr. Alberto Hewitt will order AFBs and I am submitting paperwork to the public health department about my concern for possible TB given Valeria's recent imaging results.      I called Valeria, but he did not answer.  With Mandarin  [phone] I left a voicemail stating that I was calling to review his imaging and discuss next steps. Since I was unable to reach him by phone, I will send him a [employer] message with my recommendations.  I encouraged him to read the [employer] message from me.  -Dr. Parul Sierra

## 2024-12-03 ENCOUNTER — HOSPITAL ENCOUNTER (OUTPATIENT)
Facility: MEDICAL CENTER | Age: 64
End: 2024-12-03
Payer: MEDICARE

## 2024-12-03 DIAGNOSIS — R91.8 LUNG NODULES: ICD-10-CM

## 2024-12-03 LAB
M TB CMPLX DNA ISLT/SPM QL: NOT DETECTED
MYCOBACTERIUM SPEC CULT: NORMAL
RHODAMINE-AURAMINE STN SPEC: NORMAL
RHODAMINE-AURAMINE STN SPEC: NORMAL
SIGNIFICANT IND 70042: NORMAL
SIGNIFICANT IND 70042: NORMAL
SITE SITE: NORMAL
SITE SITE: NORMAL
SOURCE SOURCE: NORMAL
SOURCE SOURCE: NORMAL

## 2024-12-03 PROCEDURE — 87116 MYCOBACTERIA CULTURE: CPT

## 2024-12-03 PROCEDURE — 87556 M.TUBERCULO DNA AMP PROBE: CPT

## 2024-12-03 PROCEDURE — 87206 SMEAR FLUORESCENT/ACID STAI: CPT

## 2024-12-04 ENCOUNTER — APPOINTMENT (OUTPATIENT)
Dept: LAB | Facility: MEDICAL CENTER | Age: 64
End: 2024-12-04
Payer: MEDICARE

## 2024-12-04 ENCOUNTER — HOSPITAL ENCOUNTER (OUTPATIENT)
Facility: MEDICAL CENTER | Age: 64
End: 2024-12-04
Payer: MEDICARE

## 2024-12-04 PROCEDURE — 87206 SMEAR FLUORESCENT/ACID STAI: CPT

## 2024-12-04 PROCEDURE — 87116 MYCOBACTERIA CULTURE: CPT

## 2024-12-04 PROCEDURE — 87556 M.TUBERCULO DNA AMP PROBE: CPT

## 2024-12-04 PROCEDURE — 87015 SPECIMEN INFECT AGNT CONCNTJ: CPT

## 2024-12-05 DIAGNOSIS — R91.8 LUNG NODULES: ICD-10-CM

## 2024-12-06 ENCOUNTER — HOSPITAL ENCOUNTER (OUTPATIENT)
Facility: MEDICAL CENTER | Age: 64
End: 2024-12-06
Payer: MEDICARE

## 2024-12-06 DIAGNOSIS — R91.8 LUNG NODULES: ICD-10-CM

## 2024-12-06 PROCEDURE — 87015 SPECIMEN INFECT AGNT CONCNTJ: CPT

## 2024-12-06 PROCEDURE — 87116 MYCOBACTERIA CULTURE: CPT

## 2024-12-06 PROCEDURE — 87206 SMEAR FLUORESCENT/ACID STAI: CPT

## 2024-12-07 LAB
MYCOBACTERIUM SPEC CULT: NORMAL
RHODAMINE-AURAMINE STN SPEC: NORMAL
RHODAMINE-AURAMINE STN SPEC: NORMAL
SIGNIFICANT IND 70042: NORMAL
SIGNIFICANT IND 70042: NORMAL
SITE SITE: NORMAL
SITE SITE: NORMAL
SOURCE SOURCE: NORMAL
SOURCE SOURCE: NORMAL

## 2024-12-09 ENCOUNTER — OFFICE VISIT (OUTPATIENT)
Dept: SLEEP MEDICINE | Facility: MEDICAL CENTER | Age: 64
End: 2024-12-09
Attending: STUDENT IN AN ORGANIZED HEALTH CARE EDUCATION/TRAINING PROGRAM
Payer: MEDICARE

## 2024-12-09 VITALS
OXYGEN SATURATION: 97 % | WEIGHT: 148 LBS | DIASTOLIC BLOOD PRESSURE: 66 MMHG | SYSTOLIC BLOOD PRESSURE: 118 MMHG | HEIGHT: 68 IN | BODY MASS INDEX: 22.43 KG/M2 | HEART RATE: 99 BPM

## 2024-12-09 DIAGNOSIS — R91.8 ABNORMAL FINDING ON LUNG IMAGING: ICD-10-CM

## 2024-12-09 PROCEDURE — 3074F SYST BP LT 130 MM HG: CPT | Performed by: STUDENT IN AN ORGANIZED HEALTH CARE EDUCATION/TRAINING PROGRAM

## 2024-12-09 PROCEDURE — 99203 OFFICE O/P NEW LOW 30 MIN: CPT | Performed by: STUDENT IN AN ORGANIZED HEALTH CARE EDUCATION/TRAINING PROGRAM

## 2024-12-09 PROCEDURE — 3078F DIAST BP <80 MM HG: CPT | Performed by: STUDENT IN AN ORGANIZED HEALTH CARE EDUCATION/TRAINING PROGRAM

## 2024-12-09 PROCEDURE — 99212 OFFICE O/P EST SF 10 MIN: CPT | Performed by: STUDENT IN AN ORGANIZED HEALTH CARE EDUCATION/TRAINING PROGRAM

## 2024-12-09 NOTE — PROGRESS NOTES
"Pulmonary Clinic- Initial Consult    Date of Service: 12/09/24    Referring Physician: Flory Zhang A.P.R.N.    Reason for Consult: Abnormal CT lung screening    Chief Complaint: Abnormal imaging    HPI:   Valeria Mattson is a very pleasant 64 y.o. male [id number] smoker, quit 2018, AMAYA on BIPAP, found to have a right upper lobe infiltrate. He has been referred to pulmonary for follow up of these imaging abnormalities.    He was originally referred for lung cancer screening by his PCP and saw Dr. Afua Sierra 10/24. CT obtained 11/25/24 and notable for \" 1. Large 8 cm right upper lobe infiltrate with small cavities. Bronchopneumonia most likely. Clinical correlation and short-term follow-up needed. 2. 3 noncalcified nodules for which follow-up is recommended.\" Patient was completely asymptomatic during this period in terms of cough, phlegm, B symptoms, or otherwise feeling ill.    Patient's PCP ordered 3 AFB sputums which were each negative for MTB PCR x3 and were each negative for acid-fast stains. Formal AFB cultures are still pending. Other lab workup shows HIV nonreactive.    Today, patient lets me know that he is completely asymptomatic from a pulmonary perspective. For the past year he has had no episodes of cough or sputum production. Moreover, he has no B symptoms. He had a TB skin test on immigration that was negative.      Past Medical History:   Diagnosis Date    Apnea, sleep     Daytime sleepiness     Diabetes (HCC)     Hyperlipidemia     Snoring        No past surgical history on file.    Social History     Socioeconomic History    Marital status:      Spouse name: Not on file    Number of children: Not on file    Years of education: Not on file    Highest education level: Not on file   Occupational History    Not on file   Tobacco Use    Smoking status: Former     Current packs/day: 0.00     Average packs/day: 1.5 packs/day for 43.0 years (64.5 ttl pk-yrs)     Types: Cigarettes     Start date: " 1975     Quit date: 2018     Years since quittin.9    Smokeless tobacco: Never   Vaping Use    Vaping status: Never Used   Substance and Sexual Activity    Alcohol use: Never    Drug use: Never    Sexual activity: Yes     Partners: Female   Other Topics Concern    Not on file   Social History Narrative    Not on file     Social Drivers of Health     Financial Resource Strain: Low Risk  (2024)    Overall Financial Resource Strain (CARDIA)     Difficulty of Paying Living Expenses: Not very hard   Food Insecurity: Food Insecurity Present (2024)    Hunger Vital Sign     Worried About Running Out of Food in the Last Year: Sometimes true     Ran Out of Food in the Last Year: Never true   Transportation Needs: No Transportation Needs (2024)    PRAPARE - Transportation     Lack of Transportation (Medical): No     Lack of Transportation (Non-Medical): No   Physical Activity: Sufficiently Active (2024)    Exercise Vital Sign     Days of Exercise per Week: 5 days     Minutes of Exercise per Session: 30 min   Stress: No Stress Concern Present (3/19/2024)    [employer] - Occupational Stress Questionnaire     Feeling of Stress : Not at all   Social Connections: Unknown (2024)    Social Connection and Isolation Panel [NHANES]     Frequency of Communication with Friends and Family: Not on file     Frequency of Social Gatherings with Friends and Family: Not on file     Attends Gnosticism Services: Not on file     Active Member of Clubs or Organizations: No     Attends Club or Organization Meetings: Not on file     Marital Status: Not on file   Intimate Partner Violence: Not on file   Housing Stability: Low Risk  (2024)    Housing Stability Vital Sign     Unable to Pay for Housing in the Last Year: No     Number of Times Moved in the Last Year: 0     Homeless in the Last Year: No          No family history on file.    Current Outpatient Medications on File Prior to  "Visit   Medication Sig Dispense Refill    atorvastatin (LIPITOR) 40 MG Tab Take 1 Tablet by mouth every day. 30 Tablet 1    SYNJARDY XR 12.5-1000 MG TABLET SR 24 HR TAKE ONE TABLET BY MOUTH TWICE A DAY WITH BREAKFAST AND DINNER FOR DIABETES 30 180 Tablet 1    tamsulosin (FLOMAX) 0.4 MG capsule Take 1 Capsule by mouth every day. 90 Capsule 1     No current facility-administered medications on file prior to visit.       Allergies: Patient has no known allergies.      Vitals:  /66 (BP Location: Right arm, Patient Position: Sitting, BP Cuff Size: Adult)   Pulse 99   Ht 1.727 m (5' 8\")   Wt 67.1 kg (148 lb)   SpO2 97%     Physical Exam:  Physical Exam  General - alert, oriented x4  HEENT - normocephalic, trachea midline  Cardiovascular - no JVD, RRR, s1, s2, RRR  Pulmonary - not in respiratory distress, CTAB, no wheezes, rhonchi or rales  Abdominal - soft, nondistended  Skin - hands appear normal, no rashes  Extremities - no lower extremity edema bilaterally  Psych - affect and mood appropriate    Objective Data:    Labs:  MTB PCR sputa negative x3  AFB stain on sputa negative x3  Final cultures on AFB sputa - pending      Pulmonary Function Tests:  PFTs as reviewed by me personally show:  None in system    Imaging:  Imaging as reviewed by me personally show:    11/25/24 CT CHEST report:  \"Lungs: 8 cm right upper lobe infiltrate posteriorly with some small cavities. Considerations include contusion in the setting of trauma and pneumonia. Short-term follow-up is recommended.     Small benign calcified left lung apex granuloma. Adjacent to that there is a 5 mm noncalcified nodule on image 37 and just below that a 6 mm or spiculated noncalcified nodule image 44. 7 mm superficial nodule in the right midlung. Mild scattered densities elsewhere.     Mediastinum/Kathrin: No significant adenopathy.     Pleura: No pleural effusion.     Cardiac: Heart normal in size without pericardial effusion. Moderate coronary artery " vascular calcification evident by this routine technique.     Soft tissues: No chest wall lesion evident.     Bones: No acute or destructive process evident.     Upper abdomen: The visible portions of the upper abdomen shows no acute abnormality.     IMPRESSION:     1. Large 8 cm right upper lobe infiltrate with small cavities. Bronchopneumonia most likely. Clinical correlation and short-term follow-up needed.     2. 3 noncalcified nodules for which follow-up is recommended.         Assessment/Plan:    1. Abnormal finding on lung imaging  CT-CHEST (THORAX) W/O    amoxicillin-clavulanate (AUGMENTIN) 875-125 MG Tab        Patient incidentally found to have 8 cm area of right upper lobe infiltrate with small cavities while undergoing routine lung cancer screening. Radiology's interpretation was that bronchopneumonia most likely. He was also noted to have 3 noncalcified nodules (5mm, 6mm and 7mm).  Differential diagnosis includes infectious and neoplastic etiologies of the right upper lobe infiltrate and the pulmonary nodules.  Patient has been ruled out for active tuberculosis with 3 negative MTB PCR sputum and 3 negative AFB smears.  Although he is asymptomatic I recommend a course of antibiotics (Augmentin 7-day course) to treat for radiographic bronchopneumonia and then to repeat CT chest 3 months from prior CT.  This will be in late February 2025.  He should then follow-up in pulmonary clinic at that time for imaging review of right upper lobe abnormalities as well as pulmonary nodules.    Return in about 11 weeks (around 2/24/2025).     This note was generated using voice recognition software which has a chance of producing errors of grammar and possibly content.  I have made every reasonable attempt to find and correct any obvious errors, but it should be expected that some may not be found prior to finalization of this note.    Time spent in record review prior to patient arrival, reviewing results, and in  face-to-face encounter totaled 35 min, excluding any procedures if performed.  __________  Tyler Montanez MD  Pulmonary and Critical Care Medicine  Pending sale to [facility]

## 2024-12-18 DIAGNOSIS — E78.5 HYPERLIPIDEMIA, UNSPECIFIED HYPERLIPIDEMIA TYPE: ICD-10-CM

## 2024-12-18 NOTE — TELEPHONE ENCOUNTER
Received request via: Pharmacy    Was the patient seen in the last year in this department? Yes    Does the patient have an active prescription (recently filled or refills available) for medication(s) requested? No    Pharmacy Name: [id number] - [place], [place] - [address]      Does the patient have FPC Plus and need 100-day supply? (This applies to ALL medications) Patient does not have SCP

## 2024-12-21 DIAGNOSIS — E78.5 HYPERLIPIDEMIA, UNSPECIFIED HYPERLIPIDEMIA TYPE: ICD-10-CM

## 2024-12-21 RX ORDER — ATORVASTATIN CALCIUM 40 MG/1
40 TABLET, FILM COATED ORAL DAILY
Qty: 30 TABLET | Refills: 0 | Status: SHIPPED | OUTPATIENT
Start: 2024-12-21 | End: 2024-12-21 | Stop reason: SDUPTHER

## 2024-12-23 RX ORDER — ATORVASTATIN CALCIUM 40 MG/1
40 TABLET, FILM COATED ORAL DAILY
Qty: 30 TABLET | Refills: 0 | Status: SHIPPED | OUTPATIENT
Start: 2024-12-23

## 2024-12-23 NOTE — TELEPHONE ENCOUNTER
Received request via: Pharmacy    Was the patient seen in the last year in this department? Yes    Does the patient have an active prescription (recently filled or refills available) for medication(s) requested? No    Pharmacy Name: [id number] - [place], [place] - [address]      Does the patient have correction Plus and need 100-day supply? (This applies to ALL medications) Patient does not have SCP

## 2025-01-06 ENCOUNTER — OFFICE VISIT (OUTPATIENT)
Dept: INTERNAL MEDICINE | Facility: OTHER | Age: 65
End: 2025-01-06
Payer: MEDICARE

## 2025-01-06 VITALS
TEMPERATURE: 98.5 F | WEIGHT: 148 LBS | HEART RATE: 84 BPM | OXYGEN SATURATION: 95 % | SYSTOLIC BLOOD PRESSURE: 107 MMHG | BODY MASS INDEX: 24.66 KG/M2 | DIASTOLIC BLOOD PRESSURE: 61 MMHG | HEIGHT: 65 IN

## 2025-01-06 DIAGNOSIS — R91.8 ABNORMAL FINDING ON LUNG IMAGING: ICD-10-CM

## 2025-01-06 DIAGNOSIS — K63.5 POLYP OF COLON, UNSPECIFIED PART OF COLON, UNSPECIFIED TYPE: ICD-10-CM

## 2025-01-06 DIAGNOSIS — E78.5 HYPERLIPIDEMIA ASSOCIATED WITH TYPE 2 DIABETES MELLITUS (HCC): ICD-10-CM

## 2025-01-06 DIAGNOSIS — E11.65 CONTROLLED TYPE 2 DIABETES MELLITUS WITH HYPERGLYCEMIA, WITHOUT LONG-TERM CURRENT USE OF INSULIN (HCC): ICD-10-CM

## 2025-01-06 DIAGNOSIS — Z00.00 ENCOUNTER FOR ANNUAL WELLNESS EXAM IN MEDICARE PATIENT: ICD-10-CM

## 2025-01-06 DIAGNOSIS — G47.33 OSA (OBSTRUCTIVE SLEEP APNEA): ICD-10-CM

## 2025-01-06 DIAGNOSIS — E78.5 HYPERLIPIDEMIA, UNSPECIFIED HYPERLIPIDEMIA TYPE: ICD-10-CM

## 2025-01-06 DIAGNOSIS — R35.1 NOCTURIA: ICD-10-CM

## 2025-01-06 DIAGNOSIS — E11.69 HYPERLIPIDEMIA ASSOCIATED WITH TYPE 2 DIABETES MELLITUS (HCC): ICD-10-CM

## 2025-01-06 PROCEDURE — 3078F DIAST BP <80 MM HG: CPT | Mod: GE

## 2025-01-06 PROCEDURE — G0438 PPPS, INITIAL VISIT: HCPCS | Mod: GE

## 2025-01-06 PROCEDURE — 3074F SYST BP LT 130 MM HG: CPT | Mod: GE

## 2025-01-06 RX ORDER — TAMSULOSIN HYDROCHLORIDE 0.4 MG/1
0.4 CAPSULE ORAL DAILY
Qty: 90 CAPSULE | Refills: 1 | Status: SHIPPED | OUTPATIENT
Start: 2025-01-06 | End: 2025-01-22 | Stop reason: SDUPTHER

## 2025-01-06 RX ORDER — ATORVASTATIN CALCIUM 40 MG/1
40 TABLET, FILM COATED ORAL DAILY
Qty: 30 TABLET | Refills: 2 | Status: SHIPPED | OUTPATIENT
Start: 2025-01-06 | End: 2025-01-22 | Stop reason: SDUPTHER

## 2025-01-06 ASSESSMENT — ENCOUNTER SYMPTOMS: GENERAL WELL-BEING: EXCELLENT

## 2025-01-06 ASSESSMENT — ACTIVITIES OF DAILY LIVING (ADL): BATHING_REQUIRES_ASSISTANCE: 0

## 2025-01-06 ASSESSMENT — PATIENT HEALTH QUESTIONNAIRE - PHQ9: CLINICAL INTERPRETATION OF PHQ2 SCORE: 0

## 2025-01-06 NOTE — PROGRESS NOTES
Chief Complaint   Patient presents with    Annual Wellness Visit     Medicare        HPI:  Valeria Mattson is a 64 y.o. here for Medicare Annual Wellness Visit.  Currently patient is feeling fine, no active complaints, patient reports he has regular social activities with friends and video chat with family in [place].  He has regular appetite and exercise, normal mood, he denies depression or anxiety, fever or chills, cough, sputum, shortness breath, palpitation, weight loss.  Patient is up to date of vaccination.  Patient is up to date cancer screening: Colonoscopy done 2 years ago, need follow-up within 2 to 3 years; lung cancer screening CT done 2 months ago, has lung nodule need follow-up with pulmonologist, CT scheduled in February 2025.   Other screening test: AAA screen test negative.     Patient Active Problem List    Diagnosis Date Noted    Controlled type 2 diabetes mellitus with hyperglycemia, without long-term current use of insulin (HCC) 09/12/2024    AMAYA (obstructive sleep apnea) 06/10/2024    Benign prostatic hyperplasia with urinary obstruction 05/08/2024    Other male erectile dysfunction 05/08/2024    Abnormal finding on lung imaging 05/08/2024    Hyperlipidemia associated with type 2 diabetes mellitus (HCC) 05/08/2024    Polyp of colon 10/17/2022    Rectal polyp 10/17/2022       Current Outpatient Medications   Medication Sig Dispense Refill    atorvastatin (LIPITOR) 40 MG Tab Take 1 Tablet by mouth every day. 30 Tablet 2    tamsulosin (FLOMAX) 0.4 MG capsule Take 1 Capsule by mouth every day. 90 Capsule 1    SYNJARDY XR 12.5-1000 MG TABLET SR 24 HR TAKE ONE TABLET BY MOUTH TWICE A DAY WITH BREAKFAST AND DINNER FOR DIABETES 30 180 Tablet 1     No current facility-administered medications for this visit.          Current supplements as per medication list.     Allergies: Patient has no known allergies.    Current social contact/activities: Friends and video chat with family     He  reports that he quit  smoking about 7 years ago. His smoking use included cigarettes. He started smoking about 50 years ago. He has a 64.5 pack-year smoking history. He has never used smokeless tobacco. He reports that he does not drink alcohol and does not use drugs.  Counseling given: Not Answered      ROS:    Gait: Uses no assistive device  Ostomy: No  Other tubes: No  Amputations: No  Chronic oxygen use: No  Last eye exam: 1 year ago   Wears hearing aids: No   : Denies any urinary leakage during the last 6 months    Screening:    Depression Screening  Little interest or pleasure in doing things?  0 - not at all  Feeling down, depressed , or hopeless? 0 - not at all  Patient Health Questionnaire Score: 0     If depressive symptoms identified deferred to follow up visit unless specifically addressed in assessment and plan.    Interpretation of PHQ-9 Total Score   Score Severity   1-4 No Depression   5-9 Mild Depression   10-14 Moderate Depression   15-19 Moderately Severe Depression   20-27 Severe Depression    Screening for Cognitive Impairment  Do you or any of your friends or family members have any concern about your memory? No  Three Minute Recall (Leader, Season, Table) 2/3    Amarjit clock face with all 12 numbers and set the hands to show 10 minutes after 11.  Yes    Cognitive concerns identified deferred for follow up unless specifically addressed in assessment and plan.    Fall Risk Assessment  Has the patient had two or more falls in the last year or any fall with injury in the last year?  No    Safety Assessment  Do you always wear your seatbelt?  Yes  Any changes to home needed to function safely? No  Difficulty hearing.  No  Patient counseled about all safety risks that were identified.    Functional Assessment ADLs  Are there any barriers preventing you from cooking for yourself or meeting nutritional needs?  No.    Are there any barriers preventing you from driving safely or obtaining transportation?  No.    Are there  any barriers preventing you from using a telephone or calling for help?  No    Are there any barriers preventing you from shopping?  No.    Are there any barriers preventing you from taking care of your own finances?  No    Are there any barriers preventing you from managing your medications?  No    Are there any barriers preventing you from showering, bathing or dressing yourself? No    Are there any barriers preventing you from doing housework or laundry? No  Are there any barriers preventing you from using the toilet?No  Are you currently engaging in any exercise or physical activity?  Yes.      Self-Assessment of Health  What is your perception of your health? Excellent    Do you sleep more than six hours a night? Yes    In the past 7 days, how much did pain keep you from doing your normal work? None    Do you spend quality time with family or friends (virtually or in person)? No    Do you usually eat a heart healthy diet that constists of a variety of fruits, vegetables, whole grains and fiber? No    Do you eat foods high in fat and/or Fast Food more than three times per week? No    How concerned are you that your medical conditions are not being well managed? Not at all    Are you worried that in the next 2 months, you may not have stable housing that you own, rent, or stay in as part of a household? No      Advance Care Planning  Do you have an Advance Directive, Living Will, Durable Power of , or POLST? No                 Health Maintenance Summary            Postponed - Zoster (Shingles) Vaccines (1 of 2) Postponed until 3/7/2025      No completion history exists for this topic.              Postponed - COVID-19 Vaccine (1 - 2024-25 season) Postponed until 10/7/2025      No completion history exists for this topic.              Postponed - Colorectal Cancer Screening (View Topic Details) Postponed until 1/1/2026      No completion history exists for this topic.              A1c Screening (Every 6  Months) Next due on 4/21/2025      10/21/2024  HEMOGLOBIN A1C    06/10/2024  POCT  A1C              Diabetes: Urine Protein Screening (Yearly) Next due on 5/20/2025 05/20/2024  MICROALBUMIN CREAT RATIO URINE              SERUM CREATININE (Yearly) Next due on 5/20/2025 05/20/2024  Comp Metabolic Panel              Scheduled - Lung Cancer Screening (Every 6 Months) Scheduled for 2/3/2025      11/25/2024  CT-LUNG CANCER-SCREENING              Diabetes: Monofilament / LE Exam (Yearly) Next due on 6/10/2025      06/10/2024  Done              Diabetes: Retinopathy Screening (Yearly) Next due on 6/12/2025 06/12/2024  POCT Retinal Eye Exam              Fasting Lipid Profile (Yearly) Next due on 10/21/2025      10/21/2024  Lipid Profile    05/20/2024  Lipid Profile              Annual Wellness Visit (Yearly) Next due on 10/28/2025      10/28/2024  Done - Comprehensive Health Assessment    10/28/2024  Level of Service: CT ANNUAL WELLNESS VISIT-INCLUDES PPPS SUBSEQUE*              IMM DTaP/Tdap/Td Vaccine (2 - Td or Tdap) Next due on 6/10/2034      06/10/2024  Imm Admin: Tdap Vaccine              Hepatitis C Screening  Addressed      11/01/2023  Reason not specified              Pneumococcal Vaccine: 65+ Years (Series Information) Completed      05/06/2024  Imm Admin: Pneumococcal Conjugate Vaccine (PCV20)              Influenza Vaccine (Series Information) Completed      10/07/2024  Imm Admin: Influenza high-dose trivalent (PF)    09/14/2015  Imm Admin: Influenza split virus trivalent (PF)              Lung Cancer Screening Shared Decision Making  Completed      10/28/2024  Registry Metric: Lung Cancer Shared Decision Making Conversation Date              Abdominal Aortic Aneurysm (AAA) Screening  Completed      11/11/2024  US-ABDOMINAL AORTA SCREENING (AAA)              Hepatitis A Vaccine (Hep A) (Series Information) Aged Out      No completion history exists for this topic.              Hepatitis B Vaccine  "(Hep B) (Series Information) Aged Out      No completion history exists for this topic.              HPV Vaccines (Series Information) Aged Out      No completion history exists for this topic.              Polio Vaccine (Inactivated Polio) (Series Information) Aged Out      No completion history exists for this topic.              Meningococcal Immunization (Series Information) Aged Out      No completion history exists for this topic.                    Patient Care Team:  Alberto Hewitt M.D. as PCP - General (Internal Medicine)  Naima Pickens R.N. as   [facility]        Social History     Tobacco Use    Smoking status: Former     Current packs/day: 0.00     Average packs/day: 1.5 packs/day for 43.0 years (64.5 ttl pk-yrs)     Types: Cigarettes     Start date:      Quit date:      Years since quittin.0    Smokeless tobacco: Never   Vaping Use    Vaping status: Never Used   Substance Use Topics    Alcohol use: Never    Drug use: Never     No family history on file.  He  has a past medical history of Apnea, sleep, Daytime sleepiness, Diabetes (HCC), Hyperlipidemia, and Snoring.   No past surgical history on file.    Exam:   /61 (BP Location: Left arm, Patient Position: Sitting, BP Cuff Size: Adult)   Pulse 84   Temp 36.9 °C (98.5 °F)   Ht 1.648 m (5' 4.9\")   Wt 67.1 kg (148 lb)   SpO2 95%  Body mass index is 24.7 kg/m².    Hearing excellent.    Dentition good  Alert, oriented in no acute distress.  Eye contact is good, speech goal directed, affect calm    Assessment and Plan.   The following treatment and monitoring plan is recommended:    1. Encounter for annual wellness exam in Medicare patient  2. Controlled type 2 diabetes mellitus with hyperglycemia, without long-term current use of insulin (HCC)  Patient is on medication Synjardy, compliant to medication, HbA1c 6.9 in , slightly trending up, counseled lifestyle modification.  Patient has no medication " refill request at this time    3. Hyperlipidemia associated with type 2 diabetes mellitus (HCC)  Patient is taking atorvastatin 40 mg daily, compliant to medication.   Last lipid panel in October 2024 improves, The 10-year ASCVD risk score (Ross COCHRAN, et al., 2019) is: 13.1%   - atorvastatin (LIPITOR) 40 MG Tab; Take 1 Tablet by mouth every day.  Dispense: 30 Tablet; Refill: 2    4. Abnormal finding on lung imaging  Recent CT shows multiple small lung nodules and and a 8-centimeter right upper lobe infiltrate with small cavities.  Patient is completely asymptomatic, 3 times of AFB sputum testing negative.  Patient has seen pulmonologist, has finished 7 days course of Augmentin for bronchopneumonia treatment, has scheduled CT scan in February 2025.  -Instruct patient to repeat CT scan on time    5. AMAYA (obstructive sleep apnea)  Patient is not on CPAP treatment due to he is unable to be Equipment because of language barrier.  -Instruct patient to  equipment as soon as possible with English-Mandarin speaker friend    6. Nocturia  No previous PSA value available, patient reports since starting the medication, his symptoms is getting better  - tamsulosin (FLOMAX) 0.4 MG capsule; Take 1 Capsule by mouth every day.  Dispense: 90 Capsule; Refill: 1    7. Polyp of colon, unspecified part of colon, unspecified type  Patient has colonoscopy done in 2023, no records available.  There should be a follow-up within 2 to 3 years per previous PCPs notes.  Patient cannot remember the facility's name, but will go to the facility to request a copy of the records.   - Instruct patient to bring the records    Services suggested: No services needed at this time  Health Care Screening: Age-appropriate preventive services recommended by USPTF and ACIP covered by Medicare were discussed today. Services ordered if indicated and agreed upon by the patient.  Referrals offered: Community-based lifestyle interventions to reduce health  risks and promote self-management and wellness, fall prevention, nutrition, physical activity, tobacco-use cessation, weight loss, and mental health services as per orders if indicated.    Discussion today about general wellness and lifestyle habits:    Prevent falls and reduce trip hazards; Cautioned about securing or removing rugs.  Have a working fire alarm and carbon monoxide detector;   Engage in regular physical activity and social activities     Follow-up: Return in about 3 months (around 4/6/2025).

## 2025-01-06 NOTE — LETTER
[facility]  Alberto Hewitt M.D.  [address]  [place] [address]  Fax: [phone]   Authorization for Release/Disclosure of   Protected Health Information   Name: VALERIA HODGE : 1960 SSN: [id number]   Address: [address]  [place] [address] Phone:    There are no phone numbers on file.   I authorize the entity listed below to release/disclose the PHI below to:   [facility]/Alberto Hewitt M.D. and Alberto Hewitt M.D.   Provider or Entity Name:     Address   City, State, Zip   Phone:      Fax:     Reason for request: continuity of care   Information to be released:    [  ] LAST COLONOSCOPY,  including any PATH REPORT and follow-up  [  ] LAST FIT/COLOGUARD RESULT [  ] LAST DEXA  [  ] LAST MAMMOGRAM  [  ] LAST PAP  [  ] LAST LABS [  ] RETINA EXAM REPORT  [  ] IMMUNIZATION RECORDS  [  ] Release all info      [  ] Check here and initial the line next to each item to release ALL health information INCLUDING  _____ Care and treatment for drug and / or alcohol abuse  _____ HIV testing, infection status, or AIDS  _____ Genetic Testing    DATES OF SERVICE OR TIME PERIOD TO BE DISCLOSED: _____________  I understand and acknowledge that:  * This Authorization may be revoked at any time by you in writing, except if your health information has already been used or disclosed.  * Your health information that will be used or disclosed as a result of you signing this authorization could be re-disclosed by the recipient. If this occurs, your re-disclosed health information may no longer be protected by State or Federal laws.  * You may refuse to sign this Authorization. Your refusal will not affect your ability to obtain treatment.  * This Authorization becomes effective upon signing and will  on (date) __________.      If no date is indicated, this Authorization will  one (1) year from the signature date.    Name: Valeria Hodge  Signature: Date:   2025     PLEASE FAX REQUESTED RECORDS BACK TO: [phone]

## 2025-01-15 LAB
MYCOBACTERIUM SPEC CULT: NORMAL
RHODAMINE-AURAMINE STN SPEC: NORMAL
SIGNIFICANT IND 70042: NORMAL
SITE SITE: NORMAL
SOURCE SOURCE: NORMAL

## 2025-01-22 DIAGNOSIS — R35.1 NOCTURIA: ICD-10-CM

## 2025-01-22 DIAGNOSIS — E11.9 CONTROLLED TYPE 2 DIABETES MELLITUS WITHOUT COMPLICATION, WITHOUT LONG-TERM CURRENT USE OF INSULIN (HCC): ICD-10-CM

## 2025-01-22 DIAGNOSIS — E78.5 HYPERLIPIDEMIA, UNSPECIFIED HYPERLIPIDEMIA TYPE: ICD-10-CM

## 2025-01-22 NOTE — TELEPHONE ENCOUNTER
Received request via: Patient    Was the patient seen in the last year in this department? Yes    Does the patient have an active prescription (recently filled or refills available) for medication(s) requested? No    Pharmacy Name: [id number] - [place], [place] - [address]      Does the patient have half-way Plus and need 100-day supply? (This applies to ALL medications) Patient does not have SCP

## 2025-01-23 RX ORDER — EMPAGLIFLOZIN, METFORMIN HYDROCHLORIDE 12.5; 1 MG/1; MG/1
TABLET, EXTENDED RELEASE ORAL
Qty: 180 TABLET | Refills: 1 | Status: SHIPPED | OUTPATIENT
Start: 2025-01-23

## 2025-01-23 RX ORDER — TAMSULOSIN HYDROCHLORIDE 0.4 MG/1
0.4 CAPSULE ORAL DAILY
Qty: 90 CAPSULE | Refills: 1 | Status: SHIPPED | OUTPATIENT
Start: 2025-01-23

## 2025-01-23 RX ORDER — ATORVASTATIN CALCIUM 40 MG/1
40 TABLET, FILM COATED ORAL DAILY
Qty: 30 TABLET | Refills: 2 | Status: SHIPPED | OUTPATIENT
Start: 2025-01-23

## 2025-02-10 ENCOUNTER — HOSPITAL ENCOUNTER (OUTPATIENT)
Dept: RADIOLOGY | Facility: MEDICAL CENTER | Age: 65
End: 2025-02-10
Attending: STUDENT IN AN ORGANIZED HEALTH CARE EDUCATION/TRAINING PROGRAM
Payer: MEDICARE

## 2025-02-10 DIAGNOSIS — R91.8 ABNORMAL FINDING ON LUNG IMAGING: ICD-10-CM

## 2025-02-10 PROCEDURE — 71250 CT THORAX DX C-: CPT

## 2025-02-23 ENCOUNTER — RESULTS FOLLOW-UP (OUTPATIENT)
Dept: SLEEP MEDICINE | Facility: MEDICAL CENTER | Age: 65
End: 2025-02-23
Payer: MEDICARE

## 2025-02-23 DIAGNOSIS — R91.1 PULMONARY NODULE: ICD-10-CM

## 2025-02-23 DIAGNOSIS — E04.1 THYROID NODULE: ICD-10-CM

## 2025-02-23 NOTE — PROGRESS NOTES
CT CHEST reviewed    RUL GGOs improved  Stable 5.8mm nodule.    Plan:  - CT CHEST 1 year    Tyler Montanez MD  Pulmonary and Critical Care Medicine  [facility]

## 2025-02-24 DIAGNOSIS — Z87.891 PERSONAL HISTORY OF NICOTINE DEPENDENCE: ICD-10-CM

## 2025-02-24 DIAGNOSIS — R91.1 PULMONARY NODULE: ICD-10-CM

## 2025-03-10 ENCOUNTER — TELEPHONE (OUTPATIENT)
Dept: SLEEP MEDICINE | Facility: MEDICAL CENTER | Age: 65
End: 2025-03-10
Payer: MEDICARE

## 2025-03-10 NOTE — TELEPHONE ENCOUNTER
Maisha for [employer] called and informed me that patient went in to  his BiPAP machine. When Maisha was explaining to that patient how the machine worked, patient informed maisha that Dr. Moore told him that he only needs to wear it when his having breathing issues. Maisha then called and wanted to see if that was true. Spoke with Dr. Moore and went over last visit note and Dr. Moore said that patient needs to wear the machine all night and every night. Informed maisha of that and she informed patient.

## 2025-03-24 ENCOUNTER — HOSPITAL ENCOUNTER (OUTPATIENT)
Dept: LAB | Facility: MEDICAL CENTER | Age: 65
End: 2025-03-24
Payer: MEDICARE

## 2025-03-24 ENCOUNTER — TELEPHONE (OUTPATIENT)
Dept: INTERNAL MEDICINE | Facility: OTHER | Age: 65
End: 2025-03-24
Payer: MEDICARE

## 2025-03-24 NOTE — TELEPHONE ENCOUNTER
Patient has an appt on March 31st, and was under the impression that he had labs that needed to be done but I do not see an order in his chart for labs?

## 2025-03-25 DIAGNOSIS — E11.9 CONTROLLED TYPE 2 DIABETES MELLITUS WITHOUT COMPLICATION, WITHOUT LONG-TERM CURRENT USE OF INSULIN (HCC): ICD-10-CM

## 2025-03-25 DIAGNOSIS — R91.8 LUNG NODULES: ICD-10-CM

## 2025-03-25 NOTE — TELEPHONE ENCOUNTER
Called patient using [employer] with [employer] , she let patient know he has 4 labs to complete before his appointment on March 31st.

## 2025-03-26 ENCOUNTER — HOSPITAL ENCOUNTER (OUTPATIENT)
Dept: LAB | Facility: MEDICAL CENTER | Age: 65
End: 2025-03-26
Payer: MEDICARE

## 2025-03-26 ENCOUNTER — HOSPITAL ENCOUNTER (OUTPATIENT)
Dept: RADIOLOGY | Facility: MEDICAL CENTER | Age: 65
End: 2025-03-26
Attending: STUDENT IN AN ORGANIZED HEALTH CARE EDUCATION/TRAINING PROGRAM
Payer: MEDICARE

## 2025-03-26 DIAGNOSIS — E04.1 THYROID NODULE: ICD-10-CM

## 2025-03-26 DIAGNOSIS — E11.9 CONTROLLED TYPE 2 DIABETES MELLITUS WITHOUT COMPLICATION, WITHOUT LONG-TERM CURRENT USE OF INSULIN (HCC): ICD-10-CM

## 2025-03-26 DIAGNOSIS — R91.8 LUNG NODULES: ICD-10-CM

## 2025-03-26 LAB
ALBUMIN SERPL BCP-MCNC: 4.2 G/DL (ref 3.2–4.9)
ALBUMIN/GLOB SERPL: 1.2 G/DL
ALP SERPL-CCNC: 53 U/L (ref 30–99)
ALT SERPL-CCNC: 16 U/L (ref 2–50)
ANION GAP SERPL CALC-SCNC: 11 MMOL/L (ref 7–16)
AST SERPL-CCNC: 25 U/L (ref 12–45)
BASOPHILS # BLD AUTO: 0.5 % (ref 0–1.8)
BASOPHILS # BLD: 0.04 K/UL (ref 0–0.12)
BILIRUB SERPL-MCNC: 0.6 MG/DL (ref 0.1–1.5)
BUN SERPL-MCNC: 22 MG/DL (ref 8–22)
CALCIUM ALBUM COR SERPL-MCNC: 8.9 MG/DL (ref 8.5–10.5)
CALCIUM SERPL-MCNC: 9.1 MG/DL (ref 8.5–10.5)
CHLORIDE SERPL-SCNC: 110 MMOL/L (ref 96–112)
CHOLEST SERPL-MCNC: 175 MG/DL (ref 100–199)
CO2 SERPL-SCNC: 21 MMOL/L (ref 20–33)
CREAT SERPL-MCNC: 0.85 MG/DL (ref 0.5–1.4)
EOSINOPHIL # BLD AUTO: 0.16 K/UL (ref 0–0.51)
EOSINOPHIL NFR BLD: 1.9 % (ref 0–6.9)
ERYTHROCYTE [DISTWIDTH] IN BLOOD BY AUTOMATED COUNT: 45.3 FL (ref 35.9–50)
EST. AVERAGE GLUCOSE BLD GHB EST-MCNC: 151 MG/DL
FASTING STATUS PATIENT QL REPORTED: NORMAL
GFR SERPLBLD CREATININE-BSD FMLA CKD-EPI: 96 ML/MIN/1.73 M 2
GLOBULIN SER CALC-MCNC: 3.4 G/DL (ref 1.9–3.5)
GLUCOSE SERPL-MCNC: 128 MG/DL (ref 65–99)
HBA1C MFR BLD: 6.9 % (ref 4–5.6)
HCT VFR BLD AUTO: 47.7 % (ref 42–52)
HDLC SERPL-MCNC: 61 MG/DL
HGB BLD-MCNC: 15.6 G/DL (ref 14–18)
IMM GRANULOCYTES # BLD AUTO: 0.02 K/UL (ref 0–0.11)
IMM GRANULOCYTES NFR BLD AUTO: 0.2 % (ref 0–0.9)
LDLC SERPL CALC-MCNC: 90 MG/DL
LYMPHOCYTES # BLD AUTO: 2.51 K/UL (ref 1–4.8)
LYMPHOCYTES NFR BLD: 29.1 % (ref 22–41)
MCH RBC QN AUTO: 29.5 PG (ref 27–33)
MCHC RBC AUTO-ENTMCNC: 32.7 G/DL (ref 32.3–36.5)
MCV RBC AUTO: 90.2 FL (ref 81.4–97.8)
MONOCYTES # BLD AUTO: 0.66 K/UL (ref 0–0.85)
MONOCYTES NFR BLD AUTO: 7.6 % (ref 0–13.4)
NEUTROPHILS # BLD AUTO: 5.24 K/UL (ref 1.82–7.42)
NEUTROPHILS NFR BLD: 60.7 % (ref 44–72)
NRBC # BLD AUTO: 0 K/UL
NRBC BLD-RTO: 0 /100 WBC (ref 0–0.2)
PLATELET # BLD AUTO: 225 K/UL (ref 164–446)
PMV BLD AUTO: 9.3 FL (ref 9–12.9)
POTASSIUM SERPL-SCNC: 4.5 MMOL/L (ref 3.6–5.5)
PROT SERPL-MCNC: 7.6 G/DL (ref 6–8.2)
RBC # BLD AUTO: 5.29 M/UL (ref 4.7–6.1)
SODIUM SERPL-SCNC: 142 MMOL/L (ref 135–145)
TRIGL SERPL-MCNC: 120 MG/DL (ref 0–149)
WBC # BLD AUTO: 8.6 K/UL (ref 4.8–10.8)

## 2025-03-26 PROCEDURE — 85025 COMPLETE CBC W/AUTO DIFF WBC: CPT

## 2025-03-26 PROCEDURE — 80053 COMPREHEN METABOLIC PANEL: CPT

## 2025-03-26 PROCEDURE — 80061 LIPID PANEL: CPT

## 2025-03-26 PROCEDURE — 76536 US EXAM OF HEAD AND NECK: CPT

## 2025-03-26 PROCEDURE — 36415 COLL VENOUS BLD VENIPUNCTURE: CPT

## 2025-03-26 PROCEDURE — 83036 HEMOGLOBIN GLYCOSYLATED A1C: CPT

## 2025-03-31 ENCOUNTER — OFFICE VISIT (OUTPATIENT)
Dept: INTERNAL MEDICINE | Facility: OTHER | Age: 65
End: 2025-03-31
Payer: MEDICARE

## 2025-03-31 VITALS
SYSTOLIC BLOOD PRESSURE: 112 MMHG | WEIGHT: 149 LBS | DIASTOLIC BLOOD PRESSURE: 67 MMHG | HEIGHT: 68 IN | BODY MASS INDEX: 22.58 KG/M2 | TEMPERATURE: 98.4 F | OXYGEN SATURATION: 96 % | HEART RATE: 91 BPM

## 2025-03-31 DIAGNOSIS — R35.1 NOCTURIA: ICD-10-CM

## 2025-03-31 DIAGNOSIS — E11.9 CONTROLLED TYPE 2 DIABETES MELLITUS WITHOUT COMPLICATION, WITHOUT LONG-TERM CURRENT USE OF INSULIN (HCC): ICD-10-CM

## 2025-03-31 DIAGNOSIS — E04.1 THYROID NODULE: ICD-10-CM

## 2025-03-31 DIAGNOSIS — Z12.11 COLON CANCER SCREENING: ICD-10-CM

## 2025-03-31 DIAGNOSIS — N52.9 ERECTILE DYSFUNCTION, UNSPECIFIED ERECTILE DYSFUNCTION TYPE: ICD-10-CM

## 2025-03-31 DIAGNOSIS — E78.5 HYPERLIPIDEMIA, UNSPECIFIED HYPERLIPIDEMIA TYPE: ICD-10-CM

## 2025-03-31 RX ORDER — SILDENAFIL 50 MG/1
50 TABLET, FILM COATED ORAL
Qty: 10 TABLET | Refills: 3 | Status: SHIPPED
Start: 2025-03-31 | End: 2025-03-31

## 2025-03-31 RX ORDER — SILDENAFIL 50 MG/1
50 TABLET, FILM COATED ORAL
Qty: 10 TABLET | Refills: 0 | Status: SHIPPED | OUTPATIENT
Start: 2025-03-31

## 2025-03-31 RX ORDER — ATORVASTATIN CALCIUM 40 MG/1
40 TABLET, FILM COATED ORAL DAILY
Qty: 30 TABLET | Refills: 2 | Status: SHIPPED | OUTPATIENT
Start: 2025-03-31

## 2025-03-31 RX ORDER — EMPAGLIFLOZIN, METFORMIN HYDROCHLORIDE 12.5; 1 MG/1; MG/1
TABLET, EXTENDED RELEASE ORAL
Qty: 180 TABLET | Refills: 1 | Status: SHIPPED | OUTPATIENT
Start: 2025-03-31

## 2025-03-31 RX ORDER — TAMSULOSIN HYDROCHLORIDE 0.4 MG/1
0.4 CAPSULE ORAL DAILY
Qty: 90 CAPSULE | Refills: 1 | Status: SHIPPED | OUTPATIENT
Start: 2025-03-31

## 2025-03-31 ASSESSMENT — FIBROSIS 4 INDEX: FIB4 SCORE: 1.833333333333333333

## 2025-04-01 NOTE — PROGRESS NOTES
Chief Complaint   Patient presents with    Follow-Up    Lab Results     In chart        HPI: Valeria Mattson is a 65 y.o. male who presented to the clinic for the following.    #Follow-up for diabetes  #Follow-up for hyperlipidemia  Recent labs shows:  Lipid panel: Total cholesterol 175, triglycerides 120, HDL 61, LDL 90  HbA1c 6.9  CBC and CMP generally unremarkable  The 10-year ASCVD risk score (Ross COCHRAN, et al., 2019) is: 16.7%    Values used to calculate the score:      Age: 65 years      Sex: Male      Is Non- : No      Diabetic: Yes      Tobacco smoker: No      Systolic Blood Pressure: 112 mmHg      Is BP treated: No      HDL Cholesterol: 61 mg/dL      Total Cholesterol: 175 mg/dL     #Follow-up for thyroid nodule  Incidental finding from the recent chest CT scan in February 2025, patient denies any recent cold or hot intolerance, fatigue, weight loss, palpitation.  Thyroid ultrasound in March 2025 shows right mid pole 3.03 x 2.43 x 1.90 cm TR 3 thyroid nodule recommending FNA biopsy.  Patient agrees.  Order is in.     #Follow-up for lung infiltration  Origin identified in last November with a 8 cm infiltration and cavity in the right upper lobe, patient is completely asymptomatic, tuberculosis workup are all negative. He received 7 days course of Augmentin to treat the suspected bronchopneumonia.  Repeat CT scan in February 2025 shows the infiltration completely resolved.  Patient keeps asymptomatic.     #Follow-up for AMAYA  Patient is not starting BiPAP due to the feeling it will interrupt his sleep and inconvenience for his nocturia.     #Medication refill  Patient requested refill of atorvastatin 40 mg for his hyperlipidemia, Synjardy 12.5-1000 mg for his diabetes, and tamsulosin 0.4 mg for his nocturia.   He also requests Viagra for his erectile dysfunction, he is previously on 25 mg without effects, will increase to 50 mg.    Patient Active Problem List    Diagnosis Date  Noted    Thyroid nodule 03/31/2025    Controlled type 2 diabetes mellitus with hyperglycemia, without long-term current use of insulin (HCC) 09/12/2024    AMAYA (obstructive sleep apnea) 06/10/2024    Benign prostatic hyperplasia with urinary obstruction 05/08/2024    Other male erectile dysfunction 05/08/2024    Abnormal finding on lung imaging 05/08/2024    Hyperlipidemia associated with type 2 diabetes mellitus (HCC) 05/08/2024    Polyp of colon 10/17/2022    Rectal polyp 10/17/2022       Current Outpatient Medications   Medication Sig Dispense Refill    atorvastatin (LIPITOR) 40 MG Tab Take 1 Tablet by mouth every day. 30 Tablet 2    SYNJARDY XR 12.5-1000 MG TABLET SR 24 HR TAKE ONE TABLET BY MOUTH TWICE A DAY WITH BREAKFAST AND DINNER FOR DIABETES 30 180 Tablet 1    tamsulosin (FLOMAX) 0.4 MG capsule Take 1 Capsule by mouth every day. 90 Capsule 1    sildenafil citrate (VIAGRA) 50 MG tablet Take 1 Tablet by mouth 1 time a day as needed for Erectile Dysfunction. 10 Tablet 3     No current facility-administered medications for this visit.       ROS: As per HPI. Additional pertinent systems as noted below.  Constitutional:  Negative for fever, chills   HENT:  Negative for ear pain, sore throat, runny nose   Eyes:  Negative for blurred vision and double vision   Cardiovascular:  Negative for chest pain, palpitations   Respiratory:  Negative for cough, sputum production, shortness of breath   Gastrointestinal: Negative for abdominal pain, nausea, vomiting, diarrhea, or blood in stools   Genitourinary: Negative for dysuria, flank pain and hematuria  Skin: Negative for rash, itching  Extremities: Negative for leg swelling   Neurologic: Negative for headaches, dizziness, seizures, weakness   Endo/Heme/Allergies: Negative for polydipsia. Does not bruise/bleed easily  Psychiatric: Negative for suicidal or homicidal ideas     /67 (BP Location: Left arm, Patient Position: Sitting, BP Cuff Size: Adult)   Pulse 91    "Temp 36.9 °C (98.4 °F) (Temporal)   Ht 1.727 m (5' 8\")   Wt 67.6 kg (149 lb)   SpO2 96%   BMI 22.66 kg/m²     Physical Exam   Constitutional:  Well developed, well nourished. Not in acute distress   HENT:  Normocephalic, Atraumatic, Oropharynx is pink and moist. No oral exudates, Nose normal   Eyes:  EOMI, Conjunctiva normal, No discharge. PERRLA   Neck:  Normal range of motion, No cervical lymphadenopathy.  Asymmetric thyroid morphology   Cardiovascular:  Regular rate and rhythm, No pedal edema, Intact distal pulses   Respiratory: Clear to auscultation bilaterally, No use of accessory muscles   Gastrointestinal: Bowel sounds normal, Soft, No tenderness, No palpable masses/hepatosplenomegaly   Skin: Warm, Dry, No erythema, No rash, no induration.   Musculoskeletal: No cyanosis or clubbing, normal ROM   Neurologic: Alert & oriented x 4, No focal deficits noted, cranial nerves II through X are grossly intact.   Psychiatric: Judgment normal, Mood normal, Memory normal     Note: I have reviewed all pertinent labs and diagnostic tests associated with this visit with specific comments listed under the assessment and plan below    Assessment and Plan  1. Thyroid nodule  Asymptomatic  Incidental finding from the CT scan, a follow-up on the ultrasound suggest FNA biopsy  - US-FNA BIOPSY W/ US GUIDANCE; Future  - TSH and free T4    2. Hyperlipidemia, unspecified hyperlipidemia type  LDL 90, patient has diabetes without other significant cardiovascular risk, goal should be less than 100  Continue atorvastatin 40 mg  - atorvastatin (LIPITOR) 40 MG Tab; Take 1 Tablet by mouth every day.  Dispense: 30 Tablet; Refill: 2    3. Controlled type 2 diabetes mellitus without complication, without long-term current use of insulin ([facility])  HbA1c 6.9, stable, in the target(less than 7)  Continue Synjardy 12.5-1000 mg  - SYNJARDY XR 12.5-1000 MG TABLET SR 24 HR; TAKE ONE TABLET BY MOUTH TWICE A DAY WITH BREAKFAST AND DINNER FOR DIABETES " 30  Dispense: 180 Tablet; Refill: 1    4. Nocturia  Symptom control well on current dose of tamsulosin, request medication refill  - tamsulosin (FLOMAX) 0.4 MG capsule; Take 1 Capsule by mouth every day.  Dispense: 90 Capsule; Refill: 1    5. Colon cancer screening  Last done in 2023, multiple polyps found, recommended repeat procedure in 3 years which is due in 2026  Will send referral for patient to make appointments in advance  - Referral to GI for Colonoscopy    6. Erectile dysfunction, unspecified erectile dysfunction type  Previously was on 25 mg, no improvement  We will try 50 mg  - sildenafil citrate (VIAGRA) 50 MG tablet; Take 1 Tablet by mouth 1 time a day as needed for Erectile Dysfunction.  Dispense: 10 Tablet; Refill: 3        Followup: Return in about 6 months (around 9/30/2025).  Signed by: JENNIFER Ashton Med Resident, PGY-2

## 2025-04-02 ENCOUNTER — HOSPITAL ENCOUNTER (OUTPATIENT)
Dept: LAB | Facility: MEDICAL CENTER | Age: 65
End: 2025-04-02
Payer: MEDICARE

## 2025-04-02 LAB
T4 FREE SERPL-MCNC: 1.55 NG/DL (ref 0.93–1.7)
TSH SERPL-ACNC: 0.39 UIU/ML (ref 0.35–5.5)

## 2025-04-02 PROCEDURE — 36415 COLL VENOUS BLD VENIPUNCTURE: CPT

## 2025-04-02 PROCEDURE — 84439 ASSAY OF FREE THYROXINE: CPT

## 2025-04-02 PROCEDURE — 84443 ASSAY THYROID STIM HORMONE: CPT

## 2025-04-09 ENCOUNTER — HOSPITAL ENCOUNTER (OUTPATIENT)
Dept: RADIOLOGY | Facility: MEDICAL CENTER | Age: 65
End: 2025-04-09
Payer: MEDICARE

## 2025-04-09 ENCOUNTER — HOSPITAL ENCOUNTER (EMERGENCY)
Facility: MEDICAL CENTER | Age: 65
End: 2025-04-09
Payer: MEDICARE

## 2025-04-09 DIAGNOSIS — E04.1 THYROID NODULE: ICD-10-CM

## 2025-04-09 PROCEDURE — 10005 FNA BX W/US GDN 1ST LES: CPT

## 2025-04-09 PROCEDURE — 88173 CYTOPATH EVAL FNA REPORT: CPT | Mod: 26 | Performed by: PATHOLOGY

## 2025-04-09 PROCEDURE — 88173 CYTOPATH EVAL FNA REPORT: CPT | Performed by: PATHOLOGY

## 2025-04-09 PROCEDURE — 36500 INSERTION OF CATHETER VEIN: CPT

## 2025-04-09 NOTE — PROGRESS NOTES
US guided right mid  thyroid nodule fine needle aspiration done by BHARAT lowery; NON-SEDATION (no H&P required as this is a NON SEDATION procedure) right anterior aspect of neck access site, dressing CDI; 3 samples in 1 jar of cytolyt obtained, 1 sample in 1 vial afirma obtained and sent to lab. Pt tolerated the procedure well. Pt hemodynamically stable pre/intra/post procedure; all questions and concerns answered prior to being d/c; patient provided with appropriate education for procedure; pt d/c home.

## 2025-04-10 LAB — CYTOLOGY REG CYTOL: NORMAL

## 2025-06-26 DIAGNOSIS — E78.5 HYPERLIPIDEMIA, UNSPECIFIED HYPERLIPIDEMIA TYPE: ICD-10-CM

## 2025-06-26 RX ORDER — ATORVASTATIN CALCIUM 40 MG/1
40 TABLET, FILM COATED ORAL DAILY
Qty: 100 TABLET | Refills: 0 | Status: SHIPPED | OUTPATIENT
Start: 2025-06-26

## 2025-06-26 NOTE — TELEPHONE ENCOUNTER
Received request via: Pharmacy    Was the patient seen in the last year in this department? Yes    Does the patient have an active prescription (recently filled or refills available) for medication(s) requested? No    Pharmacy Name: [id number] - [place], [place] - [address] [place] [address]     Does the patient have senior living Plus and need 100-day supply? (This applies to ALL medications) Yes, quantity updated to 100 days

## 2025-08-04 ENCOUNTER — OFFICE VISIT (OUTPATIENT)
Dept: URGENT CARE | Facility: CLINIC | Age: 65
End: 2025-08-04
Payer: MEDICARE

## 2025-08-04 VITALS
TEMPERATURE: 98 F | WEIGHT: 149 LBS | HEART RATE: 109 BPM | RESPIRATION RATE: 20 BRPM | DIASTOLIC BLOOD PRESSURE: 88 MMHG | BODY MASS INDEX: 22.58 KG/M2 | HEIGHT: 68 IN | SYSTOLIC BLOOD PRESSURE: 132 MMHG | OXYGEN SATURATION: 97 %

## 2025-08-04 DIAGNOSIS — J02.9 SORE THROAT: Primary | ICD-10-CM

## 2025-08-04 DIAGNOSIS — J02.9 PHARYNGITIS, UNSPECIFIED ETIOLOGY: ICD-10-CM

## 2025-08-04 LAB
FLUAV RNA SPEC QL NAA+PROBE: NEGATIVE
FLUBV RNA SPEC QL NAA+PROBE: NEGATIVE
RSV RNA SPEC QL NAA+PROBE: NEGATIVE
S PYO DNA SPEC NAA+PROBE: NOT DETECTED
SARS-COV-2 RNA RESP QL NAA+PROBE: NEGATIVE

## 2025-08-04 PROCEDURE — 3075F SYST BP GE 130 - 139MM HG: CPT | Performed by: STUDENT IN AN ORGANIZED HEALTH CARE EDUCATION/TRAINING PROGRAM

## 2025-08-04 PROCEDURE — 3079F DIAST BP 80-89 MM HG: CPT | Performed by: STUDENT IN AN ORGANIZED HEALTH CARE EDUCATION/TRAINING PROGRAM

## 2025-08-04 PROCEDURE — 87651 STREP A DNA AMP PROBE: CPT | Performed by: STUDENT IN AN ORGANIZED HEALTH CARE EDUCATION/TRAINING PROGRAM

## 2025-08-04 PROCEDURE — 87637 SARSCOV2&INF A&B&RSV AMP PRB: CPT | Performed by: STUDENT IN AN ORGANIZED HEALTH CARE EDUCATION/TRAINING PROGRAM

## 2025-08-04 PROCEDURE — 99213 OFFICE O/P EST LOW 20 MIN: CPT | Performed by: STUDENT IN AN ORGANIZED HEALTH CARE EDUCATION/TRAINING PROGRAM

## 2025-08-04 RX ORDER — AMOXICILLIN 500 MG/1
500 CAPSULE ORAL 2 TIMES DAILY
Qty: 20 CAPSULE | Refills: 0 | Status: SHIPPED | OUTPATIENT
Start: 2025-08-04 | End: 2025-08-14

## 2025-08-04 ASSESSMENT — FIBROSIS 4 INDEX: FIB4 SCORE: 1.833333333333333333

## 2025-08-08 ENCOUNTER — TELEPHONE (OUTPATIENT)
Dept: HEALTH INFORMATION MANAGEMENT | Facility: OTHER | Age: 65
End: 2025-08-08
Payer: MEDICARE

## 2025-08-18 ENCOUNTER — APPOINTMENT (OUTPATIENT)
Dept: MEDICAL GROUP | Facility: MEDICAL CENTER | Age: 65
End: 2025-08-18
Payer: MEDICARE

## 2025-08-18 DIAGNOSIS — E11.65 TYPE 2 DIABETES MELLITUS WITH HYPERGLYCEMIA, WITHOUT LONG-TERM CURRENT USE OF INSULIN (HCC): ICD-10-CM

## 2025-08-18 DIAGNOSIS — N40.1 BENIGN PROSTATIC HYPERPLASIA WITH URINARY OBSTRUCTION: ICD-10-CM

## 2025-08-18 DIAGNOSIS — E78.5 HYPERLIPIDEMIA ASSOCIATED WITH TYPE 2 DIABETES MELLITUS (HCC): ICD-10-CM

## 2025-08-18 DIAGNOSIS — N13.8 BENIGN PROSTATIC HYPERPLASIA WITH URINARY OBSTRUCTION: ICD-10-CM

## 2025-08-18 DIAGNOSIS — E11.69 HYPERLIPIDEMIA ASSOCIATED WITH TYPE 2 DIABETES MELLITUS (HCC): ICD-10-CM

## 2025-09-08 ENCOUNTER — APPOINTMENT (OUTPATIENT)
Dept: INTERNAL MEDICINE | Facility: OTHER | Age: 65
End: 2025-09-08
Payer: MEDICARE